# Patient Record
Sex: FEMALE | Race: WHITE
[De-identification: names, ages, dates, MRNs, and addresses within clinical notes are randomized per-mention and may not be internally consistent; named-entity substitution may affect disease eponyms.]

---

## 2020-02-08 ENCOUNTER — HOSPITAL ENCOUNTER (INPATIENT)
Dept: HOSPITAL 47 - 2SICU | Age: 80
LOS: 3 days | Discharge: NURSING, REHAB OR LONG-TERM CARE | DRG: 248 | End: 2020-02-11
Attending: INTERNAL MEDICINE | Admitting: INTERNAL MEDICINE
Payer: MEDICARE

## 2020-02-08 VITALS — BODY MASS INDEX: 24.9 KG/M2

## 2020-02-08 DIAGNOSIS — E78.5: ICD-10-CM

## 2020-02-08 DIAGNOSIS — E03.9: ICD-10-CM

## 2020-02-08 DIAGNOSIS — Z79.899: ICD-10-CM

## 2020-02-08 DIAGNOSIS — R26.81: ICD-10-CM

## 2020-02-08 DIAGNOSIS — D53.9: ICD-10-CM

## 2020-02-08 DIAGNOSIS — I25.5: ICD-10-CM

## 2020-02-08 DIAGNOSIS — Z80.1: ICD-10-CM

## 2020-02-08 DIAGNOSIS — N18.3: ICD-10-CM

## 2020-02-08 DIAGNOSIS — Z80.8: ICD-10-CM

## 2020-02-08 DIAGNOSIS — J90: ICD-10-CM

## 2020-02-08 DIAGNOSIS — I73.00: ICD-10-CM

## 2020-02-08 DIAGNOSIS — Z90.49: ICD-10-CM

## 2020-02-08 DIAGNOSIS — Z90.710: ICD-10-CM

## 2020-02-08 DIAGNOSIS — Z93.3: ICD-10-CM

## 2020-02-08 DIAGNOSIS — I48.0: ICD-10-CM

## 2020-02-08 DIAGNOSIS — I21.09: Primary | ICD-10-CM

## 2020-02-08 DIAGNOSIS — Z91.030: ICD-10-CM

## 2020-02-08 DIAGNOSIS — Z79.01: ICD-10-CM

## 2020-02-08 DIAGNOSIS — Z79.890: ICD-10-CM

## 2020-02-08 DIAGNOSIS — Z80.51: ICD-10-CM

## 2020-02-08 DIAGNOSIS — K83.8: ICD-10-CM

## 2020-02-08 DIAGNOSIS — G89.29: ICD-10-CM

## 2020-02-08 DIAGNOSIS — Z85.01: ICD-10-CM

## 2020-02-08 DIAGNOSIS — E87.5: ICD-10-CM

## 2020-02-08 DIAGNOSIS — I25.10: ICD-10-CM

## 2020-02-08 DIAGNOSIS — I27.20: ICD-10-CM

## 2020-02-08 DIAGNOSIS — Z87.891: ICD-10-CM

## 2020-02-08 DIAGNOSIS — M06.9: ICD-10-CM

## 2020-02-08 DIAGNOSIS — E43: ICD-10-CM

## 2020-02-08 DIAGNOSIS — M19.90: ICD-10-CM

## 2020-02-08 DIAGNOSIS — J96.11: ICD-10-CM

## 2020-02-08 DIAGNOSIS — R25.1: ICD-10-CM

## 2020-02-08 DIAGNOSIS — I12.9: ICD-10-CM

## 2020-02-08 DIAGNOSIS — D63.1: ICD-10-CM

## 2020-02-08 DIAGNOSIS — Z88.1: ICD-10-CM

## 2020-02-08 DIAGNOSIS — M81.0: ICD-10-CM

## 2020-02-08 DIAGNOSIS — D69.6: ICD-10-CM

## 2020-02-08 DIAGNOSIS — Z92.21: ICD-10-CM

## 2020-02-08 DIAGNOSIS — Z88.0: ICD-10-CM

## 2020-02-08 DIAGNOSIS — N28.1: ICD-10-CM

## 2020-02-08 DIAGNOSIS — I25.2: ICD-10-CM

## 2020-02-08 LAB — GLUCOSE BLD-MCNC: 65 MG/DL (ref 75–99)

## 2020-02-08 PROCEDURE — 93306 TTE W/DOPPLER COMPLETE: CPT

## 2020-02-08 PROCEDURE — 84439 ASSAY OF FREE THYROXINE: CPT

## 2020-02-08 PROCEDURE — 86334 IMMUNOFIX E-PHORESIS SERUM: CPT

## 2020-02-08 PROCEDURE — 82607 VITAMIN B-12: CPT

## 2020-02-08 PROCEDURE — 83883 ASSAY NEPHELOMETRY NOT SPEC: CPT

## 2020-02-08 PROCEDURE — 84165 PROTEIN E-PHORESIS SERUM: CPT

## 2020-02-08 PROCEDURE — 85610 PROTHROMBIN TIME: CPT

## 2020-02-08 PROCEDURE — 84443 ASSAY THYROID STIM HORMONE: CPT

## 2020-02-08 PROCEDURE — 83540 ASSAY OF IRON: CPT

## 2020-02-08 PROCEDURE — 80048 BASIC METABOLIC PNL TOTAL CA: CPT

## 2020-02-08 PROCEDURE — 4A023N7 MEASUREMENT OF CARDIAC SAMPLING AND PRESSURE, LEFT HEART, PERCUTANEOUS APPROACH: ICD-10-PCS

## 2020-02-08 PROCEDURE — 82728 ASSAY OF FERRITIN: CPT

## 2020-02-08 PROCEDURE — 76700 US EXAM ABDOM COMPLETE: CPT

## 2020-02-08 PROCEDURE — 83921 ORGANIC ACID SINGLE QUANT: CPT

## 2020-02-08 PROCEDURE — B2111ZZ FLUOROSCOPY OF MULTIPLE CORONARY ARTERIES USING LOW OSMOLAR CONTRAST: ICD-10-PCS

## 2020-02-08 PROCEDURE — 02703DZ DILATION OF CORONARY ARTERY, ONE ARTERY WITH INTRALUMINAL DEVICE, PERCUTANEOUS APPROACH: ICD-10-PCS

## 2020-02-08 PROCEDURE — 83550 IRON BINDING TEST: CPT

## 2020-02-08 PROCEDURE — 82747 ASSAY OF FOLIC ACID RBC: CPT

## 2020-02-08 PROCEDURE — 93458 L HRT ARTERY/VENTRICLE ANGIO: CPT

## 2020-02-08 PROCEDURE — 80053 COMPREHEN METABOLIC PANEL: CPT

## 2020-02-08 PROCEDURE — 92928 PRQ TCAT PLMT NTRAC ST 1 LES: CPT

## 2020-02-08 PROCEDURE — 85025 COMPLETE CBC W/AUTO DIFF WBC: CPT

## 2020-02-08 RX ADMIN — APIXABAN SCH MG: 2.5 TABLET, FILM COATED ORAL at 21:14

## 2020-02-08 RX ADMIN — ATORVASTATIN CALCIUM SCH MG: 80 TABLET, FILM COATED ORAL at 21:14

## 2020-02-08 RX ADMIN — GABAPENTIN SCH MG: 300 CAPSULE ORAL at 21:15

## 2020-02-08 RX ADMIN — METOPROLOL TARTRATE SCH MG: 25 TABLET, FILM COATED ORAL at 21:13

## 2020-02-08 RX ADMIN — Medication SCH UNIT: at 21:15

## 2020-02-08 RX ADMIN — Medication SCH MG: at 21:13

## 2020-02-08 RX ADMIN — NITROGLYCERIN ONE MCG: 10 INJECTION INTRAVENOUS at 11:06

## 2020-02-08 RX ADMIN — NITROGLYCERIN ONE MCG: 10 INJECTION INTRAVENOUS at 11:14

## 2020-02-08 NOTE — P.HPIM
History of Present Illness


H&P Date: 02/08/20


Chief Complaint: Jaw pain and chest pain.  Ear Pain





This 89-year-old pleasant female patient of Dr. tijerina.  She has underlying 

history of atrial fibrillation, CK D stage III, hypothyroidism, hyperlipidemia, 

rheumatoid arthritis osteoporosis and ischemic bowel disease, requiring 

colostomy.  She has had ear pain for quite a while now and is being worked up by

Dr. Tijerina, and Dr. Berg with no pathology is found.  Patient also has 

uncontrolled tremors, which is chronic and unsteady gait, presenting to [facility] secondary to chest pain radiating to the jaw, and shortness

of breath and exertion.  Unsteady gait.  Lightheadedness.  No dizziness.  

Patient denies any falls, patient lives independently at home.  Patient denies 

melena and hematochezia, no abdominal pain,





While at the emergency room of [facility], her troponin 

was elevated with a peak troponin of 9.4 was started on IV heparin, statins, 

beta blockers, aspirin for conservative management of an STEMI however she 

continued to have chest pain thereafter was transferred from the ICU to the Cath

Lab at [facility] on with subsequent admission to the ICU..  Patient 

was interrogated with a heart cath, has critical disease of the left anterior 

descending artery mid location, requiring a bare metal stent placement on 

02/08/2020 by Dr. Knight, for which recommendation was to antiplatelet therapy 

for 4 weeks, along with this modification,





Review of Systems


Constitutional: Reports as per HPI, Denies anorexia, Denies chills, Denies 

chronic headaches, Denies chronic pain, Denies daytime sleepiness, Denies 

fatigue, Denies fever, Denies lethargy, Denies malaise, Denies night sweats, 

Denies poor appetite, Denies sweats, Denies weakness, Denies weight gain, Denies

weight loss


Ears, nose, mouth and throat: Reports as per HPI, Reports ant. neck pain


Cardiovascular: Reports as per HPI, Reports decreased exercise tolerance, 

Reports dyspnea on exertion, Reports shortness of breath


Respiratory: Reports as per HPI


Gastrointestinal: Reports as per HPI, Denies abdominal pain, Denies belching, 

Denies bloating, Denies BRBPR, Denies change in bowel habits, Denies coffee 

ground emesis, Denies constipation, Denies diarrhea, Denies dyspepsia, Denies 

early satiety, Denies excessive gas, Denies heartburn, Denies hematemesis, Den

ies hematochezia, Denies indigestion, Denies jaundice, Denies lactose 

intolerance, Denies loss of appetite, Denies melena, Denies nausea, Denies 

vomiting


Genitourinary: Reports as per HPI, Denies abnormal vaginal bleeding, Denies 

decreased libido, Denies difficulty conceiving, Denies difficulty voiding, 

Denies dysmenorrhea, Denies dyspareunia, Denies dysuria, Denies flank pain, 

Denies genital sores, Denies hematuria, Denies hot flashes, Denies incomplete 

emptying, Denies kidney stones, Denies menorrhagia, Denies mixed incontinence, 

Denies nocturia, Denies pelvic pain, Denies post void dribbling, Denies 

pregnant, Denies prolapse symptoms, Denies stress incontinence, Denies urge 

incontinence, Denies urgency, Denies urinary frequency, Denies vaginal 

discharge, Denies vaginal dryness, Denies vaginal itching, Denies vaginal odor


Menstruation: Reports as per HPI


Musculoskeletal: Reports as per HPI


Integumentary: Reports as per HPI, Denies acne, Denies boils, Denies brittle 

nails, Denies change in hair/nails, Denies color changes, Denies darkening of 

skin, Denies depigmentation, Denies dryness, Denies foot/leg ulcers, Denies 

growths, Denies hirsutism, Denies lesions, Denies onychomycosis, Denies 

pruritus, Denies rash, Denies sores, Denies striae, Denies unusual bruising, 

Denies wounds


Neurological: Reports as per HPI, Denies aphasia, Denies ataxia, Denies balance 

difficulties, Denies burning pain, Denies change in mentation, Denies change in 

smell/taste, Denies change in speech, Denies confusion, Denies convulsions, 

Denies double vision, Denies gait dysfunction, Denies head injury, Denies 

headaches, Denies hearing difficulties, Denies lack of coordination, Denies loss

of vision, Denies memory loss, Denies migraines, Denies motor disturbance, 

Denies numbness, Denies paralysis, Denies paresthesias, Denies seizures, Denies 

sensory deficit, Denies spasticity, Denies syncope, Denies tic, Denies tingling,

Denies transient paralysis, Denies tremors, Denies vertigo, Denies weakness, 

Denies visual changes


Psychiatric: Reports as per HPI, Denies anhedonia, Denies anxiety, Denies 

anxiety attacks, Denies change in appetite, Denies change in libido, Denies 

change in sleep habits, Denies confusion, Denies depression, Denies difficulty 

concentrating, Denies disorientation, Denies hallucinations, Denies 

hopelessness, Denies hypersomnia, Denies insomnia, Denies irritability, Denies 

memory loss, Denies mood swings, Denies paranoia, Denies sadness/tearfulness, 

Denies sleep disturbances, Denies suicidal ideation


Endocrine: Reports as per HPI, Denies cold intolerance, Denies deepening of the 

voice, Denies excessive sweating, Denies excessive thirst, Denies fatigue, 

Denies flushing, Denies heat intolerance, Denies high blood sugars, Denies 

increase in ring/shoe/hat size, Denies low blood sugars, Denies nocturia, Denies

palpitations, Denies polydipsia, Denies polyphagia, Denies polyuria, Denies 

proptosis, Denies recent glucocorticoid use, Denies thyroid mass, Denies weight 

change


Hematologic/Lymphatic: Reports as per HPI, Denies easy bleeding, Denies easy 

bruising, Denies lymphadenopathy, Denies lymphedema, Denies thrombophilia


Allergic/Immunologic: Reports as per HPI





Past Medical History


Past Medical History: Atrial Fibrillation, Coronary Artery Disease (CAD), 

Cancer, Chest Pain / Angina, Hypertension, Myocardial Infarction (MI), 

Osteoarthritis (OA), Thyroid Disorder


Additional Past Medical History / Comment(s): " LEAKY VALVES", ESOPHAGEAL 

CANCER,  DECREASED KIDNEY FUNCTION, CKD, ischemic bowel, Raynauds, Cardiac cath 

with stents.


Last Myocardial Infarction Date:: 2/8/20


History of Any Multi-Drug Resistant Organisms: None Reported


Past Surgical History: Appendectomy, Cholecystectomy, Heart Catheterization, 

Hysterectomy


Additional Past Surgical History / Comment(s): ESOPHAGUS SURGERY, FOOT SURGERY, 

LEFT KNEE SURGERY, PARATHYROID SURGERY, BILATERAL WRIST, colectory with ostomy 

placement.


Past Anesthesia/Blood Transfusion Reactions: No Reported Reaction


Past Psychological History: No Psychological Hx Reported


Smoking Status: Former smoker


Past Alcohol Use History: Rare


Additional Past Alcohol Use History / Comment(s): STARTED SMOKING AT AGE 13 QUIT

SMOKING AT AGE 53 SMOKED 2-3PPD


Past Drug Use History: None Reported





- Past Family History


  ** Mother


Family Medical History: Cancer (Kidney)





  ** Father


Family Medical History: Cancer (Mouth cancer at 84)





  ** Daughter(s)


Family Medical History: Cancer (Lung cancer)





  ** Brother(s)


Family Medical History: No Reported History





  ** Sister(s)


Family Medical History: No Reported History





Medications and Allergies


                                Home Medications











 Medication  Instructions  Recorded  Confirmed  Type


 


Cinnamon Bark [Cinnamon] 500 mg PO DAILY 10/04/16 02/08/20 History


 


Ferrous Sulfate [Feosol] 325 mg PO BID 10/04/16 02/08/20 History


 


Furosemide [Lasix] 20 mg PO Q48H PRN 10/04/16 02/08/20 History


 


Gabapentin [Neurontin] 300 mg PO BID@0800,1200 10/04/16 02/08/20 History


 


Levothyroxine Sodium [Synthroid] 25 mcg PO DAILY 10/04/16 02/08/20 History


 


Magnesium Gluconate [Magonate] 500 mg PO BID 10/04/16 02/08/20 History


 


Multivitamins, Thera [Multivitamin] 1 tab PO DAILY 10/04/16 02/08/20 History


 


Vitamin B Complex 1 cap PO DAILY 10/04/16 02/08/20 History


 


Apixaban [Eliquis] 2.5 mg PO BID 02/08/20 02/08/20 History


 


Calcium Carbonate [Calcium] 1,200 mg PO BID 02/08/20 02/08/20 History


 


Cholecalciferol [Vitamin D3 (25 2,000 unit PO BID 02/08/20 02/08/20 History





Mcg = 1000 Iu)]    


 


Cranberry Fruit Extract [Cranberry] 500 mg PO DAILY 02/08/20 02/08/20 History


 


Gabapentin [Neurontin] 600 mg PO HS 02/08/20 02/08/20 History


 


Krill Oil 500 mg PO DAILY 02/08/20 02/08/20 History


 


Lecithin  200 mg PO DAILY 02/08/20 02/08/20 History


 


Propranolol HCl [Propranolol HCl 60 mg PO BID 02/08/20 02/08/20 History





ER]    


 


oxyCODONE HCL [oxyCODONE HCL (IR)] 10 mg PO Q4H PRN 02/08/20 02/08/20 History








                                    Allergies











Allergy/AdvReac Type Severity Reaction Status Date / Time


 


ciprofloxacin [From Cipro] Allergy  Nausea & Verified 02/08/20 14:23





   Vomiting  


 


Penicillins Allergy  Anaphylaxis Verified 02/08/20 14:23


 


venom-honey bee Allergy  Anaphylaxis Verified 02/08/20 14:23





[bee venom (honey bee)]     


 


venom-wasp [wasp venom] Allergy  Anaphylaxis Verified 02/08/20 14:23














Physical Exam


Vitals: 


                                   Vital Signs











  Temp Pulse Resp BP BP Pulse Ox


 


 02/08/20 15:50   71  12  89/52   97


 


 02/08/20 15:40   66  4 L  88/52   97


 


 02/08/20 15:30   66  12  100/63   98


 


 02/08/20 15:20   66  6 L  100/63   95


 


 02/08/20 15:10   70  22  84/53   96


 


 02/08/20 15:00   68  7 L  100/54   96


 


 02/08/20 14:50   70  32 H  100/54   95


 


 02/08/20 14:40   72  21  96/54   80 L


 


 02/08/20 14:30   68  0 L  99/62   95


 


 02/08/20 14:20   75  22  99/62   94 L


 


 02/08/20 14:10   73  23  105/60   86 L


 


 02/08/20 14:00   73  10 L  94/63   97


 


 02/08/20 13:50   75  26 H  94/63   77 L


 


 02/08/20 13:40   81  18  97/54   95


 


 02/08/20 13:30   72  16  109/52   95


 


 02/08/20 13:20   87  20  109/52   94 L


 


 02/08/20 13:12  98 F   18    95


 


 02/08/20 13:10   85  17  95/57   97


 


 02/08/20 13:00   77  13  100/56   95


 


 02/08/20 12:50   73  19  87/53   97


 


 02/08/20 12:40   73  21  116/63   79 L


 


 02/08/20 12:30   80  11 L  105/49   89 L


 


 02/08/20 12:20   71  46 H  105/49   83 L


 


 02/08/20 12:10   75  28 H  111/61   98


 


 02/08/20 12:00   85  15  104/62   94 L


 


 02/08/20 11:57  97 F L   18    96


 


 02/08/20 11:50   84  47 H  104/62   89 L


 


 02/08/20 11:42  97 F L   16   94/63  94 L


 


 02/08/20 11:27  97.4 F L   17   99/62  95


 


 02/08/20 10:28  97.2 F L   18   105/60  95


 


 02/08/20 09:50   77  19    96


 


 02/08/20 09:40   70  14    97


 


 02/08/20 09:30   71  16  99/52   96


 


 02/08/20 09:21   70  9 L    99








                                Intake and Output











 02/08/20 02/08/20 02/08/20





 06:59 14:59 22:59


 


Intake Total  895 


 


Output Total  250 


 


Balance  645 


 


Intake:   


 


  IV  200 


 


  Intake, IV Titration  275 





  Amount   


 


    Bivalirudin 250 mg In  50 





    Sodium Chloride 0.9% 50   





    ml @ 0 mls/hr IV .[facility]   





    ONE Rx#:[id number]   


 


    Sodium Chloride 0.9% 1,  225 





    000 ml @ 75 mls/hr IV .   





    [id number] [facility] Rx#:[id number]   


 


  Oral  420 


 


Output:   


 


  Urine  250 


 


Other:   


 


  Weight  56 kg 














- Constitutional


General appearance: average body habitus, cooperative, no acute distress





- EENT


Eyes: anicteric sclerae, EOMI, PERRLA, dentition normal


ENT: NA/AT, normal oropharynx





- Neck


Neck: normal ROM





- Respiratory


Respiratory: bilateral: CTA, negative: diminished, dullness, rales





- Cardiovascular


Rhythm: regular


Heart sounds: normal: S1, S2


Abnormal Heart Sounds: no systolic murmur, no diastolic murmur, no rub, no S3 

Gallop, no S4 Gallop, no click, no other





- Gastrointestinal


General gastrointestinal: normal bowel sounds, soft





- Integumentary


Integumentary: decreased turgor, normal





- Neurologic


Neurologic: CNII-XII intact





- Musculoskeletal





Tremors


Musculoskeletal: strength equal bilaterally





- Psychiatric


Psychiatric: A&O x's 3, appropriate affect, intact judgment & insight





Results


Labs: 


                  Abnormal Lab Results - Last 24 Hours (Table)











  02/08/20 Range/Units





  11:59 


 


POC Glucose (mg/dL)  65 L  (75-99)  mg/dL








                               Laboratory Results











POC Glucose (mg/dL)  65 mg/dL (75-99)  L  02/08/20  11:59    


 


POC Glu Operater Dhara Vernon   02/08/20  11:59    














Thrombosis Risk Factor Assmnt





- DVT/VTE Prophylaxis


DVT/VTE Prophylaxis: Pharmacologic Prophylaxis ordered, Low risk, early 

ambulation encouraged





- Choose All That Apply


Each Factor Represents 1 point: Acute MI, Medical pt on bed rest


Other Risk Factors: Yes


Each Risk Factor Represents 3 Points: Age 75 years or older


Other congenital or acquired thrombophilia - If yes, enter type in comment: No


Thrombosis Risk Factor Assessment Total Risk Factor Score: 5


Thrombosis Risk Factor Assessment Level: High Risk





Assessment and Plan


Plan: 





1.  Acute NSTEMI, requiring bare-metal stent to the mid LAD performed on 

02/08/2020 by Dr. Gauthier, with recommendations for dual antiplatelet therapy for 

4 weeks, risk modification, statin and aspirin/Plavix we will discontinue 

propranolol, and replace it with metoprolol 25 twice a day





2.  Rheumatoid arthritis, on gabapentin 600 mg at bedtime and 300 mg twice a 

day, not on DMARD's or prednisone





3  Paroxysmal atrial fibrillation on eliquis 2.5 mg twice a day





4.  CK D stage III, patient's creatinine was 1.9, with IV hydration, monitor for

contrast nephropathy, being monitored in a.m.





5.  Anemia chronic disease with hemoglobin of 10.3





6.  Prior history of ischemic bowel disease requiring colostomy placement and 

hemicolectomy





7.  Chronic ear pain, bilateral severe, this has resolved after stent placement 

on the mid LAD





8.  Benign tremors





9.  Severe malnutrition facial supplementation





10.  Hypothyroidism on levothyroxine 25 g daily





10.  Chronic pain on oxycodone 10 mg every 4 when necessary per home dose by PCP

## 2020-02-08 NOTE — LTR
February 8, 2020





Re: Courtney Heller





Dear Myrna:



I performed cardiac catheterization on Courtney Heller. A detailed catheterization

note is enclosed for your records. In brief, the cardiac catheterization shows a

critical stenosis involving mid LAD and patient will undergo angioplasty with stent

placement of the same.



Thank you for giving us the privilege to participate in the care of this pleasant lady.



Sincerely,





MD IAN Maynard / ALBA: [id number] / Job#: [id number]

## 2020-02-08 NOTE — PTCA
PERCUTANEOUSTRANS CORORONARY ANGIOGRAPHY



PERCUTANEOUS CORONARY INTERVENTION:



DATE OF SERVICE:

February 8, 2020.



PERFORMING PHYSICIAN:

Jadiel Knight MD.



PROCEDURE PERFORMED:

Successful stenting of the mid left anterior descending artery using 2.75 x 18 mm

Vision bare metal stent with an excellent angiographic result and reduction of stenosis

from 99% to 0%.



INDICATION:

This is an 79-year-old female patient who was admitted to [facility]

with chest discomfort and was ruled in for acute non-ST-elevation myocardial

infarction.  The patient does have multiple comorbid conditions as well as with her

age, initially, conservative medical approach was applied, but the patient continues to

have ongoing chest discomfort.  Because of that, a heart catheterization was advised.



The patient underwent a heart catheterization by Dr. Bell and that revealed critical

disease involving the mid left anterior descending artery.



COMPLICATION:

None.



LEVEL OF SEDATION:

Moderate with sedation length of 17 minutes.



PROCEDURE DESCRIPTION:

Please refer to the diagnostic heart catheterization that was performed by Dr. Bell

earlier today.



Anticoagulation was initiated using Angiomax.  Subsequently I did engage the left main

coronary artery using JL3.5 guide.  I did wire the LAD using a Whisper J wire.  After

that I did balloon angioplasty using 2.5 x 12 mm balloon before I deployed 2.75 x 18 mm

Vision bare metal stent where the stent was positioned under fluoroscopy guidance and

deployed under its nominal pressure.  The following angiogram showed excellent

angiographic results and the procedure was completed without any complication.



By the end selective left common femoral artery angiogram was performed before we

deployed the Angio-Seal groin closure device.



POSTPROCEDURE MANAGEMENT:

1. Dual antiplatelet therapy for 4 weeks.

2. Risk factors modifications.

3. Follow up with the patient.





MMODL / IJN: [id number] / Job#: [id number]

## 2020-02-08 NOTE — CC
CARDIAC CATHETERIZATION REPORT



INDICATION:

Acute anterior wall myocardial infarction.



PROCEDURE NOTE:

After obtaining informed consent, left heart catheterization and coronary angiogram

were performed via the left femoral artery using standard Bigg catheters. Her

colostomy tube is very close to the right femoral, hence we went from the left femoral

artery.  We obtained vascular access on second attempt.  The patient tolerated the

procedure well without any obvious immediate complications.  She has renal

insufficiency and is at risk for contrast induced nephropathy and has thrombocytopenia

and there is a risk of bleeding.  She understands and is agreement with the procedure.

The patient received moderate conscious sedation.  Total sedation time was 16 minutes.



FINDINGS:

1. HEMODYNAMICS: Left ventricular end-diastolic pressure is 20 mm. There is no

    significant gradient across the aortic valve.

2. LEFT VENTRICULOGRAM: Left ventriculogram is not performed.

3. ANGIOGRAPHIC DATA:

Left Main Coronary Artery: Left main coronary artery is a normal-sized vessel and

divides into left anterior descending coronary artery and circumflex coronary artery.

LAD shows a focal 99% stenosis in its midportion with delayed filling of the distal

LAD.

Circumflex coronary artery is a dominant vessel where the OM branch is totally

occluded.

Right coronary artery is a small caliber vessel with mild to moderate diffuse disease

involving the mid RCA.



CONCLUSIONS:

A 99% stenosis involving mid LAD with chronic occlusion of the OM branch and

nonobstructive disease involving right coronary artery.



PLAN:

Patient will undergo angioplasty with stent placement of LAD.





MMODL / IJN: [id number] / Job#: [id number]

## 2020-02-09 LAB
ALBUMIN SERPL-MCNC: 2.4 G/DL (ref 3.5–5)
ALP SERPL-CCNC: 107 U/L (ref 38–126)
ALT SERPL-CCNC: 26 U/L (ref 4–34)
ANION GAP SERPL CALC-SCNC: 4 MMOL/L
AST SERPL-CCNC: 88 U/L (ref 14–36)
BASOPHILS # BLD AUTO: 0 K/UL (ref 0–0.2)
BASOPHILS NFR BLD AUTO: 0 %
BUN SERPL-SCNC: 44 MG/DL (ref 7–17)
CALCIUM SPEC-MCNC: 7.8 MG/DL (ref 8.4–10.2)
CHLORIDE SERPL-SCNC: 114 MMOL/L (ref 98–107)
CO2 SERPL-SCNC: 17 MMOL/L (ref 22–30)
EOSINOPHIL # BLD AUTO: 0 K/UL (ref 0–0.7)
EOSINOPHIL NFR BLD AUTO: 1 %
ERYTHROCYTE [DISTWIDTH] IN BLOOD BY AUTOMATED COUNT: 3.06 M/UL (ref 3.8–5.4)
ERYTHROCYTE [DISTWIDTH] IN BLOOD: 13.7 % (ref 11.5–15.5)
GLUCOSE SERPL-MCNC: 97 MG/DL (ref 74–99)
HCT VFR BLD AUTO: 33.2 % (ref 34–46)
HGB BLD-MCNC: 9.9 GM/DL (ref 11.4–16)
LYMPHOCYTES # SPEC AUTO: 0.5 K/UL (ref 1–4.8)
LYMPHOCYTES NFR SPEC AUTO: 15 %
MCH RBC QN AUTO: 32.4 PG (ref 25–35)
MCHC RBC AUTO-ENTMCNC: 29.9 G/DL (ref 31–37)
MCV RBC AUTO: 108.4 FL (ref 80–100)
MONOCYTES # BLD AUTO: 0.2 K/UL (ref 0–1)
MONOCYTES NFR BLD AUTO: 6 %
NEUTROPHILS # BLD AUTO: 2.3 K/UL (ref 1.3–7.7)
NEUTROPHILS NFR BLD AUTO: 75 %
PLATELET # BLD AUTO: 67 K/UL (ref 150–450)
POTASSIUM SERPL-SCNC: 5.6 MMOL/L (ref 3.5–5.1)
PROT SERPL-MCNC: 4.7 G/DL (ref 6.3–8.2)
SODIUM SERPL-SCNC: 135 MMOL/L (ref 137–145)
T4 FREE SERPL-MCNC: 1.46 NG/DL (ref 0.78–2.19)
WBC # BLD AUTO: 3.1 K/UL (ref 3.8–10.6)

## 2020-02-09 RX ADMIN — GABAPENTIN SCH MG: 300 CAPSULE ORAL at 20:27

## 2020-02-09 RX ADMIN — LEVOTHYROXINE SODIUM SCH MCG: 25 TABLET ORAL at 06:40

## 2020-02-09 RX ADMIN — ASPIRIN 325 MG ORAL TABLET SCH MG: 325 PILL ORAL at 09:20

## 2020-02-09 RX ADMIN — METOPROLOL TARTRATE SCH MG: 25 TABLET, FILM COATED ORAL at 20:27

## 2020-02-09 RX ADMIN — Medication SCH UNIT: at 20:28

## 2020-02-09 RX ADMIN — APIXABAN SCH MG: 2.5 TABLET, FILM COATED ORAL at 09:20

## 2020-02-09 RX ADMIN — GABAPENTIN SCH MG: 300 CAPSULE ORAL at 09:20

## 2020-02-09 RX ADMIN — CLOPIDOGREL BISULFATE SCH MG: 75 TABLET ORAL at 09:20

## 2020-02-09 RX ADMIN — Medication SCH UNIT: at 09:20

## 2020-02-09 RX ADMIN — APIXABAN SCH MG: 2.5 TABLET, FILM COATED ORAL at 20:27

## 2020-02-09 RX ADMIN — METOPROLOL TARTRATE SCH MG: 25 TABLET, FILM COATED ORAL at 09:20

## 2020-02-09 RX ADMIN — GABAPENTIN SCH MG: 300 CAPSULE ORAL at 11:56

## 2020-02-09 RX ADMIN — THERA TABS SCH EACH: TAB at 09:20

## 2020-02-09 RX ADMIN — Medication SCH MG: at 09:20

## 2020-02-09 RX ADMIN — ATORVASTATIN CALCIUM SCH MG: 80 TABLET, FILM COATED ORAL at 20:26

## 2020-02-09 RX ADMIN — Medication SCH MG: at 20:28

## 2020-02-09 NOTE — P.PN
Subjective


Progress Note Date: 02/09/20








This 89-year-old pleasant female patient of Dr. tijerina.  She has underlying 

history of atrial fibrillation, CK D stage III, hypothyroidism, hyperlipidemia, 

rheumatoid arthritis osteoporosis and ischemic bowel disease, requiring 

colostomy.  She has had ear pain for quite a while now and is being worked up by

Dr. Tijerina, and Dr. Berg with no pathology is found.  Patient also has 

uncontrolled tremors, which is chronic and unsteady gait, presenting to [facility] secondary to chest pain radiating to the jaw, and shortness

of breath and exertion.  Unsteady gait.  Lightheadedness.  No dizziness.  

Patient denies any falls, patient lives independently at home.  Patient denies 

melena and hematochezia, no abdominal pain,





While at the emergency room of [facility], her troponin 

was elevated with a peak troponin of 9.4 was started on IV heparin, statins, 

beta blockers, aspirin for conservative management of an STEMI however she 

continued to have chest pain thereafter was transferred from the ICU to the Cath

Lab at [facility] on with subsequent admission to the ICU..  Patient 

was interrogated with a heart cath, has critical disease of the left anterior 

descending artery mid location, requiring a bare metal stent placement on 

02/08/2020 by Dr. Knight, for which recommendation was to antiplatelet therapy 

for 4 weeks, along with this modification,





2/9: Patient remains in the intensive care unit waiting for a selective care 

bed.  The patient denies having any chest pain, no ear pain this has resolved.  

Left groin without any bleeding or bruit hematoma.  A repeat lab work reveals of

potassium of 5.6 and one dose of Kayexalate ordered.  BUN is 44 and creatinine 

1.5, hemoglobin 9.9 and platelet count 67.  Patient is scheduled for 

echocardiogram.  Repeat lab work in the morning.





 Review of Systems 


Constitutional: Reports as per HPI, Denies anorexia, Denies chills, Denies 

chronic headaches, Denies chronic pain, Denies daytime sleepiness, Denies 

fatigue, Denies fever, Denies lethargy, Denies malaise, Denies night sweats, 

Denies poor appetite, Denies sweats, Denies weakness, Denies weight gain, Denies

weight loss


Ears, nose, mouth and throat: Denies ear pain Reports ant. neck pain


Cardiovascular: Reports as per HPI, Reports decreased exercise tolerance, 

Reports dyspnea on exertion, Reports shortness of breath


Respiratory: Reports as per HPI


Gastrointestinal: Reports as per HPI, Denies abdominal pain, Denies belching, 

Denies bloating, Denies BRBPR, Denies change in bowel habits, Denies coffee grou

nd emesis, Denies constipation, Denies diarrhea, Denies dyspepsia, Denies early 

satiety, Denies excessive gas, Denies heartburn, Denies hematemesis, Denies 

hematochezia, Denies indigestion, Denies jaundice, Denies lactose intolerance, 

Denies loss of appetite, Denies melena, Denies nausea, Denies vomiting


Genitourinary: Reports as per HPI, Denies abnormal vaginal bleeding, Denies 

decreased libido, Denies difficulty conceiving, Denies difficulty voiding, 

Denies dysmenorrhea, Denies dyspareunia, Denies dysuria, Denies flank pain, 

Denies genital sores, Denies hematuria, Denies hot flashes, Denies incomplete 

emptying, Denies kidney stones, Denies menorrhagia, Denies mixed incontinence, 

Denies nocturia, Denies pelvic pain, Denies post void dribbling, Denies 

pregnant, Denies prolapse symptoms, Denies stress incontinence, Denies urge 

incontinence, Denies urgency, Denies urinary frequency, Denies vaginal 

discharge, Denies vaginal dryness, Denies vaginal itching, Denies vaginal odor


Musculoskeletal: Reports as per HPI


Integumentary: Reports as per HPI, Denies acne, Denies boils, Denies brittle 

nails, Denies change in hair/nails, Denies color changes, Denies darkening of 

skin, Denies depigmentation, Denies dryness, Denies foot/leg ulcers, Denies 

growths, Denies hirsutism, Denies lesions, Denies onychomycosis, Denies 

pruritus, Denies rash, Denies sores, Denies striae, Denies unusual bruising, 

Denies wounds


Neurological: Reports as per HPI, Denies aphasia, Denies ataxia, Denies balance 

difficulties, Denies burning pain, Denies change in mentation, Denies change in 

smell/taste, Denies change in speech, Denies confusion, Denies convulsions, 

Denies double vision, Denies gait dysfunction, Denies head injury, Denies 

headaches, Denies hearing difficulties, Denies lack of coordination, Denies loss

of vision, Denies memory loss, Denies migraines, Denies motor disturbance, 

Denies numbness, Denies paralysis, Denies paresthesias, Denies seizures, Denies 

sensory deficit, Denies spasticity, Denies syncope, Denies tic, Denies tingling,

Denies transient paralysis, Denies tremors, Denies vertigo, Denies weakness, 

Denies visual changes


Psychiatric: Reports as per HPI, Denies anhedonia, Denies anxiety, Denies 

anxiety attacks, Denies change in appetite, Denies change in libido, Denies 

change in sleep habits, Denies confusion, Denies depression, Denies difficulty 

concentrating, Denies disorientation, Denies hallucinations, Denies 

hopelessness, Denies hypersomnia, Denies insomnia, Denies irritability, Denies 

memory loss, Denies mood swings, Denies paranoia, Denies sadness/tearfulness, 

Denies sleep disturbances, Denies suicidal ideation


Endocrine: Reports as per HPI, Denies cold intolerance, Denies deepening of the 

voice, Denies excessive sweating, Denies excessive thirst, Denies fatigue, 

Denies flushing, Denies heat intolerance, Denies high blood sugars, Denies 

increase in ring/shoe/hat size, Denies low blood sugars, Denies nocturia, Denies

palpitations, Denies polydipsia, Denies polyphagia, Denies polyuria, Denies 

proptosis, Denies recent glucocorticoid use, Denies thyroid mass, Denies weight 

change


Hematologic/Lymphatic: Reports as per HPI, Denies easy bleeding, Denies easy 

bruising, Denies lymphadenopathy, Denies lymphedema, Denies thrombophilia


Allergic/Immunologic: Reports as per HPI





Objective





- Vital Signs


Vital signs: 


                                   Vital Signs











Temp  98 F   02/09/20 12:00


 


Pulse  68   02/09/20 12:00


 


Resp  15   02/09/20 12:00


 


BP  93/54   02/09/20 12:00


 


Pulse Ox  95   02/09/20 12:00








                                 Intake & Output











 02/08/20 02/09/20 02/09/20





 18:59 06:59 18:59


 


Intake Total 1500 900 450


 


Output Total 575 660 815


 


Balance 925 240 -365


 


Weight 56 kg 59 kg 


 


Intake:   


 


   825 150


 


    Sodium Chloride 0.9% 1,  825 150





    000 ml @ 75 mls/hr IV .   





    [id number] [facility] Rx#:[id number]   


 


  Intake, IV Titration 700 75 300





  Amount   


 


    Bivalirudin 250 mg In 100  





    Sodium Chloride 0.9% 50   





    ml @ 0 mls/hr IV .STK-MED   





    ONE Rx#:[id number]   


 


    Sodium Chloride 0.9% 1, 600 75 300





    000 ml @ 75 mls/hr IV .   





    [id number] [facility] Rx#:[id number]   


 


  Oral 600  


 


Output:   


 


  Urine 575 360 165


 


  Stool  300 650


 


Other:   


 


  Voiding Method Indwelling Catheter Indwelling Catheter Indwelling Catheter














- Exam





- Constitutional


General appearance: average body habitus, cooperative, no acute distress, p

atient is in ICU bed





- EENT


Eyes: anicteric sclerae, EOMI, PERRLA, dentition normal


ENT: NA/AT, normal oropharynx





- Neck


Neck: normal ROM





- Respiratory


Respiratory: bilateral: CTA, negative: diminished, dullness, rales





- Cardiovascular


Rhythm: regular


Heart sounds: normal: S1, S2


Abnormal Heart Sounds: no systolic murmur, no diastolic murmur, no rub, no S3 

Gallop, no S4 Gallop, no click, no other





- Gastrointestinal


General gastrointestinal: normal bowel sounds, soft





- Integumentary


Integumentary: decreased turgor, normal





- Neurologic


Neurologic: CNII-XII intact





- Musculoskeletal





Tremors


Musculoskeletal: strength equal bilaterally





- Psychiatric


Psychiatric: A&O x's 3, appropriate affect, intact judgment & insight





- Labs


CBC & Chem 7: 


                                 02/09/20 04:05





                                 02/09/20 04:05


Labs: 


                  Abnormal Lab Results - Last 24 Hours (Table)











  02/09/20 02/09/20 Range/Units





  04:05 04:05 


 


WBC   3.1 L  (3.8-10.6)  k/uL


 


RBC   3.06 L  (3.80-5.40)  m/uL


 


Hgb   9.9 L  (11.4-16.0)  gm/dL


 


Hct   33.2 L  (34.0-46.0)  %


 


MCV   108.4 H  (80.0-100.0)  fL


 


MCHC   29.9 L  (31.0-37.0)  g/dL


 


Plt Count   67 L  (150-450)  k/uL


 


Lymphocytes #   0.5 L  (1.0-4.8)  k/uL


 


Macrocytosis   Marked A  


 


Sodium  135 L   (137-145)  mmol/L


 


Potassium  5.6 H   (3.5-5.1)  mmol/L


 


Chloride  114 H   ()  mmol/L


 


Carbon Dioxide  17 L   (22-30)  mmol/L


 


BUN  44 H   (7-17)  mg/dL


 


Creatinine  1.50 H   (0.52-1.04)  mg/dL


 


Calcium  7.8 L   (8.4-10.2)  mg/dL


 


AST  88 H   (14-36)  U/L


 


Total Protein  4.7 L   (6.3-8.2)  g/dL


 


Albumin  2.4 L   (3.5-5.0)  g/dL


 


TSH  0.288 L   (0.465-4.680)  mIU/L














Assessment and Plan


Plan: 





1.  Acute NSTEMI, requiring bare-metal stent to the mid LAD performed on 

02/08/2020 by Dr. Gauthier, with recommendations for dual antiplatelet therapy for 

4 weeks, risk modification, statin and aspirin/Plavix we will discontinue 

propranolol, and replace it with metoprolol 25 twice a day.  Transfer to cardiac

stepdown unit





2.  Rheumatoid arthritis, on gabapentin 600 mg at bedtime and 300 mg twice a 

day, not on DMARD's or prednisone





3  Paroxysmal atrial fibrillation on eliquis 2.5 mg twice a day





4.  CK D stage III, patient's creatinine was 1.9, with IV hydration, monitor for

contrast nephropathy, being monitored in a.m.





5.  Anemia chronic disease with hemoglobin of 10.3





6.  Prior history of ischemic bowel disease requiring colostomy placement and 

hemicolectomy





7.  Chronic ear pain, bilateral severe, this has resolved after stent placement 

on the mid LAD





8.  Benign tremors





9.  Severe malnutrition facial supplementation





10.  Hypothyroidism on levothyroxine 25 g daily





10.  Chronic pain on oxycodone 10 mg every 4 when necessary per home dose by PCP





11.  Hyperkalemia most likely related to chronic kidney disease.  Kayexalate 1 

dose ordered.





Discharge plan: To be determined





Impression and plan of care have been directed as dictated by the signing 

physician.  Leena Barragan nurse practitioner acting as scribe for signing 

physician.

## 2020-02-09 NOTE — PN
PROGRESS NOTE



Courtney is admitted to hospital with acute myocardial infarction.  Underwent cardiac

catheterization and angioplasty of mid LAD.  This morning she is doing well.  Does not

have any chest pain, somewhat hypotensive and is on optimal medical therapy.



PHYSICAL EXAMINATION:

On exam, she is comfortable at rest.  Vital signs are stable.  Left groin is free of

bleeding, bruit or hematoma.  Foot pulses are intact.  Heart exam reveals first and

second heart sounds.  No gallop.  She has irregular rhythm.



Currently on Eliquis 2.5 b.i.d., aspirin, Lipitor, Lasix, Lopressor and Plavix.



LABS:

Labs show that her potassium is elevated at 5.6, BUN is 44, creatinine is 1.5,

hemoglobin is 9.9, platelet count is low at 67.



ASSESSMENT:

Acute anterior wall myocardial infarction status post catheterization and angioplasty

of LAD.  The patient is doing well and we will continue current medications. Obtain a

2D echo on her tomorrow morning.





MMODL / IJN: [id number] / Job#: [id number]

## 2020-02-10 LAB
ALBUMIN SERPL-MCNC: 2.3 G/DL (ref 3.5–5)
ALP SERPL-CCNC: 94 U/L (ref 38–126)
ALT SERPL-CCNC: 19 U/L (ref 4–34)
ANION GAP SERPL CALC-SCNC: 4 MMOL/L
AST SERPL-CCNC: 54 U/L (ref 14–36)
BASOPHILS # BLD AUTO: 0 K/UL (ref 0–0.2)
BASOPHILS NFR BLD AUTO: 0 %
BUN SERPL-SCNC: 38 MG/DL (ref 7–17)
CALCIUM SPEC-MCNC: 7.8 MG/DL (ref 8.4–10.2)
CHLORIDE SERPL-SCNC: 115 MMOL/L (ref 98–107)
CO2 SERPL-SCNC: 17 MMOL/L (ref 22–30)
EOSINOPHIL # BLD AUTO: 0.1 K/UL (ref 0–0.7)
EOSINOPHIL NFR BLD AUTO: 2 %
ERYTHROCYTE [DISTWIDTH] IN BLOOD BY AUTOMATED COUNT: 2.84 M/UL (ref 3.8–5.4)
ERYTHROCYTE [DISTWIDTH] IN BLOOD: 13.8 % (ref 11.5–15.5)
FERRITIN SERPL-MCNC: 254.8 NG/ML (ref 10–291)
GLUCOSE SERPL-MCNC: 97 MG/DL (ref 74–99)
HCT VFR BLD AUTO: 30.9 % (ref 34–46)
HGB BLD-MCNC: 9.6 GM/DL (ref 11.4–16)
INR PPP: 1.1 (ref ?–1.2)
IRON SERPL-MCNC: 17 UG/DL (ref 50–170)
LYMPHOCYTES # SPEC AUTO: 0.7 K/UL (ref 1–4.8)
LYMPHOCYTES NFR SPEC AUTO: 17 %
MCH RBC QN AUTO: 33.9 PG (ref 25–35)
MCHC RBC AUTO-ENTMCNC: 31.2 G/DL (ref 31–37)
MCV RBC AUTO: 108.7 FL (ref 80–100)
MONOCYTES # BLD AUTO: 0.3 K/UL (ref 0–1)
MONOCYTES NFR BLD AUTO: 6 %
NEUTROPHILS # BLD AUTO: 3 K/UL (ref 1.3–7.7)
NEUTROPHILS NFR BLD AUTO: 72 %
PLATELET # BLD AUTO: 86 K/UL (ref 150–450)
POTASSIUM SERPL-SCNC: 5.2 MMOL/L (ref 3.5–5.1)
PROT SERPL-MCNC: 4.5 G/DL (ref 6.3–8.2)
PT BLD: 11 SEC (ref 9–12)
SODIUM SERPL-SCNC: 136 MMOL/L (ref 137–145)
TIBC SERPL-MCNC: 210 UG/DL (ref 228–460)
VIT B12 SERPL-MCNC: 1177 PG/ML (ref 200–944)
WBC # BLD AUTO: 4.2 K/UL (ref 3.8–10.6)

## 2020-02-10 RX ADMIN — METOPROLOL TARTRATE SCH MG: 25 TABLET, FILM COATED ORAL at 08:30

## 2020-02-10 RX ADMIN — CLOPIDOGREL BISULFATE SCH MG: 75 TABLET ORAL at 08:30

## 2020-02-10 RX ADMIN — ATORVASTATIN CALCIUM SCH MG: 80 TABLET, FILM COATED ORAL at 21:28

## 2020-02-10 RX ADMIN — GABAPENTIN SCH MG: 300 CAPSULE ORAL at 08:29

## 2020-02-10 RX ADMIN — Medication SCH MG: at 21:28

## 2020-02-10 RX ADMIN — LORATADINE SCH MG: 10 TABLET ORAL at 11:12

## 2020-02-10 RX ADMIN — THERA TABS SCH EACH: TAB at 08:30

## 2020-02-10 RX ADMIN — FUROSEMIDE SCH MG: 20 TABLET ORAL at 11:13

## 2020-02-10 RX ADMIN — APIXABAN SCH MG: 2.5 TABLET, FILM COATED ORAL at 08:29

## 2020-02-10 RX ADMIN — Medication SCH UNIT: at 08:29

## 2020-02-10 RX ADMIN — APIXABAN SCH MG: 2.5 TABLET, FILM COATED ORAL at 21:28

## 2020-02-10 RX ADMIN — ASPIRIN 325 MG ORAL TABLET SCH MG: 325 PILL ORAL at 08:29

## 2020-02-10 RX ADMIN — METOPROLOL TARTRATE SCH MG: 25 TABLET, FILM COATED ORAL at 21:28

## 2020-02-10 RX ADMIN — Medication SCH MG: at 08:30

## 2020-02-10 RX ADMIN — GABAPENTIN SCH MG: 300 CAPSULE ORAL at 21:28

## 2020-02-10 RX ADMIN — GABAPENTIN SCH MG: 300 CAPSULE ORAL at 13:58

## 2020-02-10 RX ADMIN — Medication SCH UNIT: at 21:28

## 2020-02-10 RX ADMIN — LEVOTHYROXINE SODIUM SCH MCG: 25 TABLET ORAL at 05:41

## 2020-02-10 NOTE — PN
PROGRESS NOTE



An 79-year-old lady that is admitted to hospital with anterior wall myocardial

infarction, underwent cardiac catheterization and angioplasty of LAD.  She is feeling

well.  Denies any episodes of chest pain.  She is on Eliquis 2.5 b.i.d., aspirin,

Lipitor, Lasix that I am starting her on today, Lopressor 25 b.i.d., and Synthroid.



PHYSICAL EXAMINATION:

On exam, patient is comfortable at rest.  Vital signs are stable.

There is no jugular venous distention.

Chest exam reveals good air entry bilaterally.

Heart exam reveals first and second heart sounds.  No gallop. Has an ejection systolic

murmur in the aortic area.

Abdomen is soft.

Exam of extremities did not reveal any edema.  Peripheral pulses are felt.



The patient is currently on aspirin, Eliquis, Lipitor, Lasix, and Lopressor.



ASSESSMENT:

Acute anterior wall myocardial infarction, status post catheterization and angioplasty

of LAD.



PLAN:

I am going to review the echo results once they are available and continue current

medications.  We should be able to discharge her home tomorrow.





MMODL / IJN: [id number] / Job#: [id number]

## 2020-02-10 NOTE — ECHOF
Referral Reason:Recent stent placement



MEASUREMENTS

--------

HEIGHT: 132.1 cm

WEIGHT: 59.0 kg

BP: 

IVSd:   0.9 cm     (0.6 - 1.1)

LVIDd:   4.2 cm     (3.9 - 5.3)

LVPWd:   1.1 cm     (0.6 - 1.1)

IVSs:   0.9 cm

LVIDs:   3.3 cm

LVPWs:   1.2 cm

LAESV Index (A-L):   27.60 ml/m

Ao Diam:   2.7 cm     (2.0 - 3.7)

AV Cusp:   1.4 cm     (1.5 - 2.6)

LA Diam:   2.9 cm     (2.7 - 3.8)

MV EXCURSION:   19.783 mm     (> 18.000)

MV EF SLOPE:   101 mm/s     (70 - 150)

EPSS:   2.6 cm

MV E Milton:   1.09 m/s

MV DecT:   163 ms

MV A Milton:   1.33 m/s

MV E/A Ratio:   0.82 

AR PHT:   315 ms

RAP:   5.00 mmHg

RVSP:   70.62 mmHg

TAPSE:   23.60 mm







FINDINGS

--------

Sinus rhythm.

This was a technically good study.

The left ventricular size is normal.   Left ventricular wall thickness is normal.   Overall left vent
ricular systolic function is moderate-severely impaired with, an EF between 30 - 35 %.   Increased LA
P stage 3 Diastolic Dysfunction.   Mid inferoseptal LV wall motion is hypokinetic.    Apical anterior
 LV wall motion is hypokinetic.    Apical lateral LV wall motion is hypokinetic.    Apical inferior L
V wall motion is hypokinetic.    Apical septum LV wall motion is hypokinetic.    Septal Hypokinesis

The right ventricle is normal in size.

The left atrial size is normal.    Normal LA  size by volume 22+/-6 ml/m2.

The right atrial size is normal.

Aortic valve is trileaflet and is mildly thickened.   There is mild aortic regurgitation.

The mitral valve is normal.   The mitral valve leaflets are mildly thickened.   Mild mitral annular c
alcification present.   Moderate-to-severe mitral regurgitation is present.

The tricuspid valve appears structurally normal.   Severe tricuspid regurgitation present.   There is
 severe pulmonary hypertension.   The right ventricular systolic pressure, as measured by Doppler, is
 70.62mmHg.

There is no pulmonic regurgitation present.

The aortic root size is normal.

Normal inferior vena cava with normal inspiratory collapse consistent with estimated right atrial pre
ssure of  5 mmHg.

There is no pericardial effusion.



CONCLUSIONS

--------

1. Sinus rhythm.

2. This was a technically good study.

3. The left ventricular size is normal.

4. Left ventricular wall thickness is normal.

5. Increased LAP stage 3 Diastolic Dysfunction.

6. Mid inferoseptal LV wall motion is hypokinetic.

7. Apical anterior LV wall motion is hypokinetic.

8. Apical lateral LV wall motion is hypokinetic.

9. Apical inferior LV wall motion is hypokinetic.

10. Apical septum LV wall motion is hypokinetic.

11. Septal Hypokinesis

12. The right ventricle is normal in size.

13. The left atrial size is normal.

14. Normal LA size by volume 22+/-6 ml/m2.

15. The right atrial size is normal.

16. Aortic valve is trileaflet and is mildly thickened.

17. There is mild aortic regurgitation.

18. The mitral valve is normal.

19. The mitral valve leaflets are mildly thickened.

20. Mild mitral annular calcification present.

21. Moderate-to-severe mitral regurgitation is present.

22. The tricuspid valve appears structurally normal.

23. Severe tricuspid regurgitation present.

24. There is severe pulmonary hypertension.

25. The right ventricular systolic pressure, as measured by Doppler, is 70.62mmHg.

26. There is no pulmonic regurgitation present.

27. The aortic root size is normal.

28. Normal inferior vena cava with normal inspiratory collapse consistent with estimated right atrial
 pressure of  5 mmHg.

29. There is no pericardial effusion.





SONOGRAPHER: Charlene Delarosa RDCS

## 2020-02-10 NOTE — P.CONS
History of Present Illness





- Reason for Consult


Consult date: 02/10/20


thrombocytopenia


Requesting physician: Adeel Bell





- Chief Complaint


AMI





- History of Present Illness





Mrs. Heller is a very pleasant 79-year-old  female patient of Dr. Bill who was due to be seen tomorrow by Hematology for suspect myelodysplasia. 

Patient unfortunately, last Friday, began having symptoms of chest pain and 

shortness of breath, troponin levels were elevated when she came to [facility], she 

was transferred to [facility], she is now status post cardiac catheterization 

with bare-metal stenting.  She is on dual antiplatelet therapy, she is on 

Eliquis 2.5 mg twice a day for atrial fibrillation.


Pt denied prior Hx of anemia or low plt, unusual bruising, petechiae, 

hemoptysis, hematemesis, hematuria, hematochezia, melena, hemorrhoids.  She has 

a history of chemo and surgery (gastric pull through) for esophageal cancer, t

reated in 1998 by Dr. Stoner and Dr. Haque, no recurrence.  She also has a 

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  She is 

otherwise is decent health, fairly active. 





Review of Systems





14 point ROS is negative except as stated in HPI   





Past Medical History


Past Medical History: Atrial Fibrillation, Coronary Artery Disease (CAD), 

Cancer, Chest Pain / Angina, Hypertension, Myocardial Infarction (MI), Ost

eoarthritis (OA), Thyroid Disorder


Additional Past Medical History / Comment(s): " LEAKY VALVES", ESOPHAGEAL 

CANCER,  DECREASED KIDNEY FUNCTION, CKD, ischemic bowel, Raynauds, Cardiac cath 

with stents.


Last Myocardial Infarction Date:: 2/8/20


History of Any Multi-Drug Resistant Organisms: None Reported


Past Surgical History: Appendectomy, Cholecystectomy, Heart Catheterization, 

Hysterectomy


Additional Past Surgical History / Comment(s): ESOPHAGUS SURGERY, FOOT SURGERY, 

LEFT KNEE SURGERY, PARATHYROID SURGERY, BILATERAL WRIST, colectory with ostomy 

placement.


Past Anesthesia/Blood Transfusion Reactions: No Reported Reaction


Past Psychological History: No Psychological Hx Reported


Smoking Status: Former smoker


Past Alcohol Use History: Rare


Additional Past Alcohol Use History / Comment(s): STARTED SMOKING AT AGE 13 QUIT

SMOKING AT AGE 53 SMOKED 2-3PPD


Past Drug Use History: None Reported





- Past Family History


  ** Mother


Family Medical History: Cancer (Kidney)





  ** Father


Family Medical History: Cancer (Mouth cancer at 84)





  ** Daughter(s)


Family Medical History: Cancer (Lung cancer)





  ** Brother(s)


Family Medical History: No Reported History





  ** Sister(s)


Family Medical History: No Reported History





Medications and Allergies


                                Home Medications











 Medication  Instructions  Recorded  Confirmed  Type


 


Cinnamon Bark [Cinnamon] 500 mg PO DAILY 10/04/16 02/08/20 History


 


Ferrous Sulfate [Feosol] 325 mg PO BID 10/04/16 02/08/20 History


 


Furosemide [Lasix] 20 mg PO Q48H PRN 10/04/16 02/08/20 History


 


Gabapentin [Neurontin] 300 mg PO BID@0800,1200 10/04/16 02/08/20 History


 


Levothyroxine Sodium [Synthroid] 25 mcg PO DAILY 10/04/16 02/08/20 History


 


Magnesium Gluconate [Magonate] 500 mg PO BID 10/04/16 02/08/20 History


 


Multivitamins, Thera [Multivitamin] 1 tab PO DAILY 10/04/16 02/08/20 History


 


Vitamin B Complex 1 cap PO DAILY 10/04/16 02/08/20 History


 


Apixaban [Eliquis] 2.5 mg PO BID 02/08/20 02/08/20 History


 


Calcium Carbonate [Calcium] 1,200 mg PO BID 02/08/20 02/08/20 History


 


Cholecalciferol [Vitamin D3 (25 2,000 unit PO BID 02/08/20 02/08/20 History





Mcg = 1000 Iu)]    


 


Cranberry Fruit Extract [Cranberry] 500 mg PO DAILY 02/08/20 02/08/20 History


 


Gabapentin [Neurontin] 600 mg PO HS 02/08/20 02/08/20 History


 


Krill Oil 500 mg PO DAILY 02/08/20 02/08/20 History


 


Lecithin  200 mg PO DAILY 02/08/20 02/08/20 History


 


Propranolol HCl [Propranolol HCl 60 mg PO BID 02/08/20 02/08/20 History





ER]    


 


oxyCODONE HCL [oxyCODONE HCL (IR)] 10 mg PO Q4H PRN 02/08/20 02/08/20 History








                                    Allergies











Allergy/AdvReac Type Severity Reaction Status Date / Time


 


ciprofloxacin [From Cipro] Allergy  Nausea & Verified 02/08/20 14:23





   Vomiting  


 


Penicillins Allergy  Anaphylaxis Verified 02/08/20 14:23


 


venom-honey bee Allergy  Anaphylaxis Verified 02/08/20 14:23





[bee venom (honey bee)]     


 


venom-wasp [wasp venom] Allergy  Anaphylaxis Verified 02/08/20 14:23














Physical Exam


Vitals: 


                                   Vital Signs











  Temp Pulse Resp BP Pulse Ox


 


 02/10/20 08:00  97.6 F  83  16  116/56  96


 


 02/10/20 04:00  98.8 F  77  13  104/59  98


 


 02/10/20 03:00   80  14  85/52  97


 


 02/10/20 02:00   79  13  110/64  98


 


 02/10/20 01:00   82  16  110/64  96


 


 02/10/20 00:18      92 L


 


 02/10/20 00:00  98.0 F  86  16  96/57  93 L


 


 02/09/20 23:00   79  15  96/57  92 L


 


 02/09/20 22:00   78  17  114/61  89 L


 


 02/09/20 21:00   86  16  114/61  92 L


 


 02/09/20 20:37   93  22  114/61  92 L


 


 02/09/20 20:00  97.6 F  94  17  114/61  91 L


 


 02/09/20 19:18      92 L


 


 02/09/20 16:00  98.0 F  76  13  99/58  94 L


 


 02/09/20 15:00   82  13   92 L


 


 02/09/20 14:00   84  18   93 L


 


 02/09/20 13:00   76  20  93/54  95


 


 02/09/20 12:00  98 F  68  15  93/54  95








                                Intake and Output











 02/09/20 02/10/20 02/10/20





 22:59 06:59 14:59


 


Intake Total 640 200 480


 


Output Total 310 360 140


 


Balance 330 -160 340


 


Intake:   


 


  Oral 640 200 480


 


Output:   


 


  Urine 160 260 140


 


  Stool 150 100 


 


Other:   


 


  Voiding Method Indwelling Catheter Indwelling Catheter 


 


  Weight  61.6 kg 














- Constitutional


General appearance: average body habitus, cooperative, no acute distress





- EENT


Eyes: anicteric sclerae, EOMI


ENT: hearing grossly normal, normal oropharynx





- Neck


Neck: no lymphadenopathy





- Respiratory


Respiratory: bilateral: CTA





- Cardiovascular





venous prominence on the left chest and LUE, torturous veins 


Rhythm: regular


Heart sounds: normal: S1, S2


Abnormal Heart Sounds: no systolic murmur, no diastolic murmur, no rub, no S3 

Gallop, no S4 Gallop, no click, no other


  ** leg


Peripheral Edema: bilateral: None





- Gastrointestinal





LLQ ostomy


General gastrointestinal: no absent bowel sounds, decreased bowel sounds, no dis

tended, no hepatomegaly, no hyperactive bowel sounds, no normal bowel sounds, no

organomegaly, no rigid, no scaphoid, soft, no splenomegaly, no tenderness, no 

umbilical hernia, no ventral hernia





- Integumentary





ecchymosis in known areas of trauma


Integumentary: pale





- Neurologic





generalized tremor


Neurologic: CNII-XII intact





- Musculoskeletal


Musculoskeletal: strength equal bilaterally





- Psychiatric


Psychiatric: A&O x's 3, appropriate affect, intact judgment & insight





Results


CBC & Chem 7: 


                                 02/10/20 05:11





                                 02/10/20 05:11


Labs: 


                  Abnormal Lab Results - Last 24 Hours (Table)











  02/10/20 02/10/20 Range/Units





  05:11 05:11 


 


RBC   2.84 L  (3.80-5.40)  m/uL


 


Hgb   9.6 L  (11.4-16.0)  gm/dL


 


Hct   30.9 L  (34.0-46.0)  %


 


MCV   108.7 H  (80.0-100.0)  fL


 


Plt Count   86 L  (150-450)  k/uL


 


Lymphocytes #   0.7 L  (1.0-4.8)  k/uL


 


Macrocytosis   Marked A  


 


Sodium  136 L   (137-145)  mmol/L


 


Potassium  5.2 H   (3.5-5.1)  mmol/L


 


Chloride  115 H   ()  mmol/L


 


Carbon Dioxide  17 L   (22-30)  mmol/L


 


BUN  38 H   (7-17)  mg/dL


 


Creatinine  1.43 H   (0.52-1.04)  mg/dL


 


Calcium  7.8 L   (8.4-10.2)  mg/dL


 


AST  54 H   (14-36)  U/L


 


Total Protein  4.5 L   (6.3-8.2)  g/dL


 


Albumin  2.3 L   (3.5-5.0)  g/dL














Assessment and Plan


(1) Thrombocytopenia


Current Visit: Yes   Status: Acute   Priority: High   Code(s): D69.6 - 

THROMBOCYTOPENIA, UNSPECIFIED   SNOMED Code(s): 916315970


   





(2) Macrocytic anemia


Current Visit: Yes   Status: Chronic   Priority: Medium   Code(s): D53.9 - 

NUTRITIONAL ANEMIA, UNSPECIFIED   SNOMED Code(s): 60970383


   


Plan: 





Anemia, thrombocytopenia, work up ordered, iron studies and for paraproteinemia.

 


From chart review anemia is stable.  Transfuse for Hgb <7.  Platelets are low 

but, up today from yesterday, intermittent decrease over the last 6 months, 

worse now then previous.  Transfuse if symptomatic or plt<50,000.  


No transfusions needed at this time.


CBC daily while inpatient


Ok for antiplatelet/anticoagulation therapy at this time.  I see from Cardiology

note they are defining duration of dual antiplatelet therapy to reduce the 

duration of bleeding risk to pt.  Platelets need to be >50,000 to continue.  CBC

while inpatient, would recommend close CBC f/u outpatient.  


Pt education on bleeding s/s, risks, safety.


Will f/u on labs.

## 2020-02-10 NOTE — P.PN
Subjective


Progress Note Date: 02/10/20








This 89-year-old pleasant female patient of Dr. tijerina.  She has underlying 

history of atrial fibrillation, CK D stage III, hypothyroidism, hyperlipidemia, 

rheumatoid arthritis osteoporosis and ischemic bowel disease, requiring 

colostomy.  She has had ear pain for quite a while now and is being worked up by

Dr. Tijerina, and Dr. Berg with no pathology is found.  Patient also has 

uncontrolled tremors, which is chronic and unsteady gait, presenting to [facility] secondary to chest pain radiating to the jaw, and shortness

of breath and exertion.  Unsteady gait.  Lightheadedness.  No dizziness.  

Patient denies any falls, patient lives independently at home.  Patient denies 

melena and hematochezia, no abdominal pain,





While at the emergency room of [facility], her troponin 

was elevated with a peak troponin of 9.4 was started on IV heparin, statins, 

beta blockers, aspirin for conservative management of an STEMI however she 

continued to have chest pain thereafter was transferred from the ICU to the Cath

Lab at [facility] on with subsequent admission to the ICU..  Patient 

was interrogated with a heart cath, has critical disease of the left anterior 

descending artery mid location, requiring a bare metal stent placement on 

02/08/2020 by Dr. Knight, for which recommendation was to antiplatelet therapy 

for 4 weeks, along with this modification,





2/9: Patient remains in the intensive care unit waiting for a selective care 

bed.  The patient denies having any chest pain, no ear pain this has resolved.  

Left groin without any bleeding or bruit hematoma.  A repeat lab work reveals of

potassium of 5.6 and one dose of Kayexalate ordered.  BUN is 44 and creatinine 

1.5, hemoglobin 9.9 and platelet count 67.  Patient is scheduled for 

echocardiogram.  Repeat lab work in the morning.





2/10: Patient remains in intensive care unit waiting for selective care bed.  

Patient denies any chest pain, shortness of breath, no ear pain.  She has been 

afebrile, heart rate 83, blood pressure 116/56, pulse ox 96% on 2 L nasal 

cannula.  Repeat blood work reveals BUN of 38 and creatinine 1.43, potassium 

5.2, sodium 136, chloride 115, CO2 17, hemoglobin 9.6, platelet count 86.  AST 

54.  TSH 0.288 and free T4 1 0.46.  Patient has been seen by oncology for 

thrombocytopenia and anemia.  Patient is okay for antiplatelet anticoagulation 

at this time.  Platelets need to be greater than 50,000 to continue.  Follow up 

as an outpatient. Abdominal ultrasound revealscommon bile duct dilatation and 

intrahepatic biliary ductal dilatation with obstruction of the pancreatic head. 

Diffuse heterogenetic of the hepatic parenchyma correlate with liver function 

test.  Atrophic kidneys with few left renal cyst appearing benign.  Partially 

visualized small bilateral pleural effusions.  No evidence of spinal megaly nor 

hepatomegaly.  Cardiology is planning on eliquis 2.5 mg twice daily, aspirin and

she started Lasix today.  Plan is for discharge tomorrow.  Patient states that 

she is going to [facility] for subacute rehab.














Review Of Systems:


Constitutional: No fever, no chills, no night sweats.  No weight change.  

Generalized weakness.  No daytime sleepiness.


EENT: No headache.  No blurred vision or double vision, no loss of vision.  No 

loss of Hearing, no ringing in the ears, no dizziness.  No nasal drainage or 

congestion.  No epistaxis.  No sore throat.


Lungs: No shortness of breath, cough, no sputum production.  No wheezing.


Cardiovascular: No chest pain, no lower extremity edema.  No palpitations.  No 

paroxysmal nocturnal dyspnea.  No orthopnea.  No lightheadedness or dizziness.  

No syncopal episodes.


Abdominal: No abdominal pain.  No nausea, vomiting.  No diarrhea.  No 

constipation.  No bloody or tarry stools..  No loss of appetite.


Genitourinary: No dysuria, increased frequency, urgency.  No urinary retention.


Musculoskeletal: No myalgias.  Reports muscle weakness, no gait dysfunction, no 

frequent falls.  No back pain.  No neck pain.


Integumentary: No wounds, no lesions.  No rash or pruritus.  No unusual 

bruising.  No change in hair or nails.


Neurologic: No aphasia. No facial droop. No change in mentation. No head injury.

No headache. No paralysis. No paresthesia.


Psychiatric: No depression.  No anxiety.  No mood swings.


Endocrine: No abnormal blood sugars.  No weight change.  No excessive sweating 

or thirst.    








Objective





- Vital Signs


Vital signs: 


                                   Vital Signs











Temp  97.6 F   02/10/20 08:00


 


Pulse  83   02/10/20 08:00


 


Resp  16   02/10/20 08:00


 


BP  116/56   02/10/20 08:00


 


Pulse Ox  96   02/10/20 08:00








                                 Intake & Output











 02/09/20 02/10/20 02/10/20





 18:59 06:59 18:59


 


Intake Total 690 600 480


 


Output Total 925 560 140


 


Balance -235 40 340


 


Weight  61.6 kg 


 


Intake:   


 


    


 


    Sodium Chloride 0.9% 1, 150  





    000 ml @ 75 mls/hr IV .   





    [id number] JOSE Rx#:[id number]   


 


  Intake, IV Titration 300  





  Amount   


 


    Sodium Chloride 0.9% 1, 300  





    000 ml @ 75 mls/hr IV .   





    [id number] JOSE Rx#:[id number]   


 


  Oral 240 600 480


 


Output:   


 


  Urine 275 310 140


 


  Stool 650 250 


 


Other:   


 


  Voiding Method Indwelling Catheter Indwelling Catheter 














- Exam





- Constitutional


General appearance: average body habitus, cooperative, no acute distress, p

atient is resting in chair





- EENT


Eyes: anicteric sclerae, EOMI, PERRLA, dentition normal


ENT: NA/AT, normal oropharynx





- Neck


Neck: normal ROM





- Respiratory


Respiratory: bilateral: CTA, negative: diminished, dullness, rales





- Cardiovascular


Rhythm: regular


Heart sounds: normal: S1, S2


Abnormal Heart Sounds: no systolic murmur, no diastolic murmur, no rub, no S3 

Gallop, no S4 Gallop, no click, no other





- Gastrointestinal


General gastrointestinal: normal bowel sounds, soft





- Integumentary


Integumentary: decreased turgor, normal





- Neurologic


Neurologic: CNII-XII intact





- Musculoskeletal





Tremors


Musculoskeletal: strength equal bilaterally





- Psychiatric


Psychiatric: A&O x's 3, appropriate affect, intact judgment & insight





- Labs


CBC & Chem 7: 


                                 02/10/20 05:11





                                 02/10/20 05:11


Labs: 


                  Abnormal Lab Results - Last 24 Hours (Table)











  02/10/20 02/10/20 Range/Units





  05:11 05:11 


 


RBC   2.84 L  (3.80-5.40)  m/uL


 


Hgb   9.6 L  (11.4-16.0)  gm/dL


 


Hct   30.9 L  (34.0-46.0)  %


 


MCV   108.7 H  (80.0-100.0)  fL


 


Plt Count   86 L  (150-450)  k/uL


 


Lymphocytes #   0.7 L  (1.0-4.8)  k/uL


 


Macrocytosis   Marked A  


 


Sodium  136 L   (137-145)  mmol/L


 


Potassium  5.2 H   (3.5-5.1)  mmol/L


 


Chloride  115 H   ()  mmol/L


 


Carbon Dioxide  17 L   (22-30)  mmol/L


 


BUN  38 H   (7-17)  mg/dL


 


Creatinine  1.43 H   (0.52-1.04)  mg/dL


 


Calcium  7.8 L   (8.4-10.2)  mg/dL


 


AST  54 H   (14-36)  U/L


 


Total Protein  4.5 L   (6.3-8.2)  g/dL


 


Albumin  2.3 L   (3.5-5.0)  g/dL














Assessment and Plan


Plan: 





1.  Acute NSTEMI, requiring bare-metal stent to the mid LAD performed on 

02/08/2020 by Dr. Gauthier, with recommendations for dual antiplatelet therapy for 

4 weeks, risk modification, statin and aspirin/Plavix we will discontinue 

propranolol, and replace it with metoprolol 25 twice a day.  Transfer to cardiac

stepdown unit





2.  Rheumatoid arthritis, on gabapentin 600 mg at bedtime and 300 mg twice a 

day, not on DMARD's or prednisone





3  Paroxysmal atrial fibrillation on eliquis 2.5 mg twice a day





4.  CK D stage III, patient's creatinine was 1.9, with IV hydration, monitor for

contrast nephropathy, being monitored in a.m.





5.  Anemia chronic disease with hemoglobin of 10.3





6.  Prior history of ischemic bowel disease requiring colostomy placement and 

hemicolectomy





7.  Chronic ear pain, bilateral severe, this has resolved after stent placement 

on the mid LAD





8.  Benign tremors





9.  Severe malnutrition facial supplementation





10.  Hypothyroidism on levothyroxine 25 g daily





10.  Chronic pain on oxycodone 10 mg every 4 when necessary per home dose by PCP





11.  Hyperkalemia most likely related to chronic kidney disease.  Kayexalate 1 

dose ordered.





12.  Thrombocytopenia.  Oncology consult appreciated.





Discharge plan: [facility] tomorrow





Impression and plan of care have been directed as dictated by the signing 

physician.  Leena Barragan nurse practitioner acting as scribe for signing 

physician.

## 2020-02-10 NOTE — US
EXAMINATION TYPE: US abdomen complete

 

DATE OF EXAM: 2/10/2020

 

COMPARISON: NONE

 

CLINICAL HISTORY: thrombocytopenia. Assess liver and spleen size per order; gallbladder and colon rem
shantel/patient has ileostomy

 

EXAM MEASUREMENTS:

 

Liver Length:  11.7 cm   

Gallbladder Wall:  surgically removed   

CBD:  1.2 cm

Spleen:  9.3 x 9.7 x 5.2 cm   

Right Kidney:  8.5 x 4.7 x 3.9 cm 

Left Kidney:  7.5 x 4.3 x 3.1 cm   

 

 

 

Pancreas:  Pancreatic head and tail obscured by overlying bowel gas

Liver:  multiple, intrahepatic dilated bile ducts noted. Heterogenous parenchyma throughout. 

Gallbladder:  surgically removed

**Evidence for sonographic Addison's sign:  RUQ tenderness verbalized by patient

CBD:  abnormally dilated for 8th decade and post cholecystectomy 

Spleen:  wnl   

Right Kidney:  small for size   

Left Kidney:  small for size; couple of mid cortical cysts with larger =1.7 x 1.7 x 1.6cm; thin mitzy
x     

Upper IVC:  wnl  

Abd Aorta:  ectatic appearance noted mid and distally 

 

Bilateral pleural effusions are noted.

Patient stated had CT abdomen and US of abdomen at [facility]

 

 

IMPRESSION: 

1. Common bile duct dilatation and intrahepatic biliary ductal dilatation with obscuration of the pan
creatic head. Patient describes outside prior CT. This could be obtained. If unobtainable repeat abdo
albino ultrasound with contrast would be recommended to assess for pancreatic head mass.

2. Diffuse heterogeneity of the hepatic parenchyma. Correlate with liver function tests.

3. Atrophic size of the kidneys with few left renal cysts appearing benign and associated type I.

4. Partially visualized at least small bilateral pleural effusions.

5. No evidence of splenomegaly nor hepatomegaly.

## 2020-02-11 VITALS — SYSTOLIC BLOOD PRESSURE: 114 MMHG | RESPIRATION RATE: 18 BRPM | DIASTOLIC BLOOD PRESSURE: 51 MMHG

## 2020-02-11 VITALS — TEMPERATURE: 97.6 F

## 2020-02-11 VITALS — HEART RATE: 84 BPM

## 2020-02-11 LAB
ALBUMIN SERPL ELPH-MCNC: 2.22 G/DL (ref 3.8–4.9)
ANION GAP SERPL CALC-SCNC: 6 MMOL/L
BASOPHILS # BLD AUTO: 0 K/UL (ref 0–0.2)
BASOPHILS NFR BLD AUTO: 1 %
BUN SERPL-SCNC: 39 MG/DL (ref 7–17)
CALCIUM SPEC-MCNC: 8.2 MG/DL (ref 8.4–10.2)
CHLORIDE SERPL-SCNC: 111 MMOL/L (ref 98–107)
CO2 SERPL-SCNC: 19 MMOL/L (ref 22–30)
EOSINOPHIL # BLD AUTO: 0.1 K/UL (ref 0–0.7)
EOSINOPHIL NFR BLD AUTO: 1 %
ERYTHROCYTE [DISTWIDTH] IN BLOOD BY AUTOMATED COUNT: 2.85 M/UL (ref 3.8–5.4)
ERYTHROCYTE [DISTWIDTH] IN BLOOD: 13.8 % (ref 11.5–15.5)
GAMMA GLOB SERPL ELPH-MCNC: 0.6 G/DL (ref 0.7–1.5)
GLUCOSE SERPL-MCNC: 115 MG/DL (ref 74–99)
HCT VFR BLD AUTO: 30.8 % (ref 34–46)
HGB BLD-MCNC: 9.6 GM/DL (ref 11.4–16)
KAPPA LC FREE SER NEPH-MCNC: 4.14 MG/DL (ref 0.33–1.94)
LYMPHOCYTES # SPEC AUTO: 0.5 K/UL (ref 1–4.8)
LYMPHOCYTES NFR SPEC AUTO: 11 %
MCH RBC QN AUTO: 33.6 PG (ref 25–35)
MCHC RBC AUTO-ENTMCNC: 31.1 G/DL (ref 31–37)
MCV RBC AUTO: 107.9 FL (ref 80–100)
MONOCYTES # BLD AUTO: 0.3 K/UL (ref 0–1)
MONOCYTES NFR BLD AUTO: 7 %
NEUTROPHILS # BLD AUTO: 3.7 K/UL (ref 1.3–7.7)
NEUTROPHILS NFR BLD AUTO: 78 %
PLATELET # BLD AUTO: 82 K/UL (ref 150–450)
POTASSIUM SERPL-SCNC: 4.8 MMOL/L (ref 3.5–5.1)
SODIUM SERPL-SCNC: 136 MMOL/L (ref 137–145)
WBC # BLD AUTO: 4.7 K/UL (ref 3.8–10.6)

## 2020-02-11 RX ADMIN — GABAPENTIN SCH MG: 300 CAPSULE ORAL at 12:43

## 2020-02-11 RX ADMIN — LEVOTHYROXINE SODIUM SCH MCG: 25 TABLET ORAL at 05:41

## 2020-02-11 RX ADMIN — FUROSEMIDE SCH MG: 20 TABLET ORAL at 09:02

## 2020-02-11 RX ADMIN — GABAPENTIN SCH MG: 300 CAPSULE ORAL at 09:03

## 2020-02-11 RX ADMIN — Medication SCH MG: at 09:03

## 2020-02-11 RX ADMIN — METOPROLOL TARTRATE SCH MG: 25 TABLET, FILM COATED ORAL at 09:03

## 2020-02-11 RX ADMIN — LORATADINE SCH MG: 10 TABLET ORAL at 09:03

## 2020-02-11 RX ADMIN — CLOPIDOGREL BISULFATE SCH MG: 75 TABLET ORAL at 09:02

## 2020-02-11 RX ADMIN — ASPIRIN 325 MG ORAL TABLET SCH MG: 325 PILL ORAL at 09:02

## 2020-02-11 RX ADMIN — THERA TABS SCH EACH: TAB at 09:02

## 2020-02-11 RX ADMIN — Medication SCH UNIT: at 09:02

## 2020-02-11 RX ADMIN — APIXABAN SCH MG: 2.5 TABLET, FILM COATED ORAL at 09:02

## 2020-02-11 NOTE — PN
PROGRESS NOTE



Courtney is an 79-year-old lady who was admitted to the hospital with anterior wall

myocardial infarction.  She underwent cardiac catheterization and angioplasty of LAD.

This morning the patient is doing well, free of symptoms and is currently in the

process of being sent to a nursing home.  An echocardiogram showed an ejection fraction

of 30% to 35% with severe pulmonary hypertension.  She has evidence of myocardial

infarction in LAD distribution.



LABS:

Labs show hemoglobin of 9.6.  Potassium is 4.8, creatinine is 1.6.



Current medications include aspirin, Eliquis, Lipitor, Plavix, and Lopressor.



PHYSICAL EXAMINATION:

On exam, comfortable at rest.

Vital signs are stable.

There is no jugular venous distention.

Chest exam reveals good air entry bilaterally.

Heart reveals first and second heart sounds.  No gallop.

Examination of the extremities did not reveal any edema.  Peripheral pulses are felt.



ASSESSMENT:

1. Status post anterior wall myocardial infarction.

2. Ischemic cardiomyopathy.



PLAN:

The patient will be treated with optimal medical therapy and discharged home later

today.





MMODL / IJN: [id number] / Job#: [id number]

## 2020-02-11 NOTE — P.DS
Providers


Date of admission: 


02/08/20 10:19





Expected date of discharge: 02/11/20


Attending physician: 


Myrna Tijerina





Consults: 





                                        





02/08/20 11:27


Consult Physician Routine 


   Consulting Provider: [facility]


   Consult Reason/Comments: Post Interventional patient


   Do you want consulting provider notified?: Already Contacted





02/10/20 09:21


Consult Physician Routine 


   Consulting Provider: Darryl Cali


   Consult Reason/Comments: thrombocytopenia


   Do you want consulting provider notified?: Yes











Primary care physician: 


Myrna Tijerina





Hospital Course: 





This 89-year-old pleasant female patient of Dr. tijerina.  She has underlying 

history of atrial fibrillation, CK D stage III, hypothyroidism, hyperlipidemia, 

rheumatoid arthritis osteoporosis and ischemic bowel disease, requiring 

colostomy.  She has had ear pain for quite a while now and is being worked up by

Dr. Tijerina, and Dr. Berg with no pathology is found.  Patient also has 

uncontrolled tremors, which is chronic and unsteady gait, presenting to [facility] secondary to chest pain radiating to the jaw, and shortness

of breath and exertion.  Unsteady gait.  Lightheadedness.  No dizziness.  

Patient denies any falls, patient lives independently at home.  Patient denies 

melena and hematochezia, no abdominal pain,





While at the emergency room of [facility], her troponin 

was elevated with a peak troponin of 9.4 was started on IV heparin, statins, 

beta blockers, aspirin for conservative management of an STEMI however she 

continued to have chest pain thereafter was transferred from the ICU to the Cath

Lab at [facility] on with subsequent admission to the ICU..  Patient 

was interrogated with a heart cath, has critical disease of the left anterior 

descending artery mid location, requiring a bare metal stent placement on 

02/08/2020 by Dr. Knight, for which recommendation was to antiplatelet therapy 

for 4 weeks, along with this modification,





2/9: Patient remains in the intensive care unit waiting for a selective care 

bed.  The patient denies having any chest pain, no ear pain this has resolved.  

Left groin without any bleeding or bruit hematoma.  A repeat lab work reveals of

potassium of 5.6 and one dose of Kayexalate ordered.  BUN is 44 and creatinine 

1.5, hemoglobin 9.9 and platelet count 67.  Patient is scheduled for 

echocardiogram.  Repeat lab work in the morning.





2/10: Patient remains in intensive care unit waiting for [facility] care bed.  

Patient denies any chest pain, shortness of breath, no ear pain.  She has been 

afebrile, heart rate 83, blood pressure 116/56, pulse ox 96% on 2 L nasal 

cannula.  Repeat blood work reveals BUN of 38 and creatinine 1.43, potassium 

5.2, sodium 136, chloride 115, CO2 17, hemoglobin 9.6, platelet count 86.  AST 

54.  TSH 0.288 and free T4 1 0.46.  Patient has been seen by oncology for 

thrombocytopenia and anemia.  Patient is okay for antiplatelet anticoagulation 

at this time.  Platelets need to be greater than 50,000 to continue.  Follow up 

as an outpatient. Abdominal ultrasound revealscommon bile duct dilatation and 

intrahepatic biliary ductal dilatation with obstruction of the pancreatic head. 

Diffuse heterogenetic of the hepatic parenchyma correlate with liver function 

test.  Atrophic kidneys with few left renal cyst appearing benign.  Partially 

visualized small bilateral pleural effusions.  No evidence of spinal megaly nor 

hepatomegaly.  Cardiology is planning on eliquis 2.5 mg twice daily, aspirin and

she started Lasix today.  Plan is for discharge tomorrow.  Patient states that 

she is going to [facility] for subacute rehab.





2/11: Patient has been afebrile, heart rate 85, blood pressure 111/55, pulse ox 

96%.  Repeat blood work reveals WBC 4.7, hemoglobin 9.6, platelet count 82.  

Sodium 136, potassium 4.8, chloride 111, CO2 19, BUN 39 creatinine 1.67.  Iron 

17, TIBC 210, iron saturation 8.1, ferritin 254.8, PEP 4.1, vitamin B12 1177.  

Free kappa, free lambda, Immunofixation, methylmalonic acid are pending.  He has

been cleared for discharge by cardiology.  Patient will be discharged [facility] 

once all arrangements are completed.











Discharge diagnoses:


1.  Acute NSTEMI, requiring bare-metal stent to the mid LAD performed on 

02/08/2020 by Dr. Gauthier, with recommendations for dual antiplatelet therapy for 

4 weeks, risk modification, statin and aspirin/Plavix 


2.  Rheumatoid arthritis


3   Paroxysmal atrial fibrillation


4.  CKD stage III


5.  Anemia chronic kidney disease


6.  Prior history of ischemic bowel disease requiring colostomy placement and 

hemicolectomy


7.  Chronic ear pain, bilateral severe, this has resolved after stent placement


8.  Benign tremors


9.  Severe protein calorie malnutrition


10.  Hypothyroidism 


10.  Chronic pain


11.  Hyperkalemia most likely related to chronic kidney disease.


12.  Thrombocytopenia.  





Discharge plan: [place] under the care of Dr. Tijerina





Impression and plan of care have been directed as dictated by the signing 

physician.  Leena Barragan nurse practitioner acting as scribe for signing 

physician.








Patient Condition at Discharge: Good





Plan - Discharge Summary


Discharge Rx Participant: Yes


New Discharge Prescriptions: 


New


   Loratadine [Claritin] 10 mg PO DAILY  tab


   Furosemide [Lasix] 20 mg PO DAILY  tab


   Atorvastatin [Lipitor] 80 mg PO HS  tab


   Metoprolol Tartrate [Lopressor] 25 mg PO BID  tab


   Gabapentin [Neurontin] 300 mg PO BID@0800,1200 #6 cap


   Nitroglycerin Sl Tabs [Nitrostat] 0.4 mg SUBLINGUAL Q5M PRN  tab


     PRN Reason: Chest Pain


   Clopidogrel [Plavix] 75 mg PO DAILY  tab


   Aspirin EC [Ecotrin Low Dose] 81 mg PO DAILY #30 tablet.


   Lisinopril [Zestril] 2.5 mg PO DAILY  tab





Continue


   Levothyroxine Sodium [Synthroid] 25 mcg PO DAILY


   Magnesium Gluconate [Magonate] 500 mg PO BID


   Ferrous Sulfate [Iron (65 MG Elemental)] 325 mg PO BID


   Vitamin B Complex 1 cap PO DAILY


   Multivitamins, Thera [Multivitamin (formulary)] 1 tab PO DAILY


   Apixaban [Eliquis] 2.5 mg PO BID


   Calcium Carbonate [Calcium] 1,200 mg PO BID


   Cholecalciferol [Vitamin D3 (25 Mcg = 1000 Iu)] 2,000 unit PO BID


   Gabapentin [Neurontin] 600 mg PO HS #3 cap


   oxyCODONE HCL [oxyCODONE HCL (IR)] 10 mg PO Q4H PRN #18 tab


     PRN Reason: Pain





Discontinued


   Furosemide [Lasix] 20 mg PO Q48H PRN


     PRN Reason: Edema


   Gabapentin [Neurontin] 300 mg PO BID@0800,1200


   Cinnamon Bark [Cinnamon] 500 mg PO DAILY


   Lecithin  200 mg PO DAILY


   Cranberry Fruit Extract [Cranberry] 500 mg PO DAILY


   Krill Oil 500 mg PO DAILY


   Propranolol HCl [Propranolol HCl ER] 60 mg PO BID


Discharge Medication List





Ferrous Sulfate [Iron (65 MG Elemental)] 325 mg PO BID 10/04/16 [History]


Levothyroxine Sodium [Synthroid] 25 mcg PO DAILY 10/04/16 [History]


Magnesium Gluconate [Magonate] 500 mg PO BID 10/04/16 [History]


Multivitamins, Thera [Multivitamin (formulary)] 1 tab PO DAILY 10/04/16 

[History]


Vitamin B Complex 1 cap PO DAILY 10/04/16 [History]


Apixaban [Eliquis] 2.5 mg PO BID 02/08/20 [History]


Calcium Carbonate [Calcium] 1,200 mg PO BID 02/08/20 [History]


Cholecalciferol [Vitamin D3 (25 Mcg = 1000 Iu)] 2,000 unit PO BID 02/08/20 

[History]


Aspirin EC [Ecotrin Low Dose] 81 mg PO DAILY #30 tablet.dr 02/11/20 [Rx]


Atorvastatin [Lipitor] 80 mg PO HS  tab 02/11/20 [Rx]


Clopidogrel [Plavix] 75 mg PO DAILY  tab 02/11/20 [Rx]


Furosemide [Lasix] 20 mg PO DAILY  tab 02/11/20 [Rx]


Gabapentin [Neurontin] 300 mg PO BID@0800,1200 #6 cap 02/11/20 [Rx]


Gabapentin [Neurontin] 600 mg PO HS #3 cap 02/11/20 [Rx]


Lisinopril [Zestril] 2.5 mg PO DAILY  tab 02/11/20 [Rx]


Loratadine [Claritin] 10 mg PO DAILY  tab 02/11/20 [Rx]


Metoprolol Tartrate [Lopressor] 25 mg PO BID  tab 02/11/20 [Rx]


Nitroglycerin Sl Tabs [Nitrostat] 0.4 mg SUBLINGUAL Q5M PRN  tab 02/11/20 [Rx]


oxyCODONE HCL [oxyCODONE HCL (IR)] 10 mg PO Q4H PRN #18 tab 02/11/20 [Rx]








Follow up Appointment(s)/Referral(s): 


Myrna Tijerina MD [Primary Care Provider] - 1 Week


Adeel Bell MD [STAFF PHYSICIAN] - 1 Week


Activity/Diet/Wound Care/Special Instructions: 


Give Lasix at Noon


Discharge Disposition: TRANSFER TO SNF/ECF

## 2021-05-10 ENCOUNTER — HOSPITAL ENCOUNTER (INPATIENT)
Dept: HOSPITAL 47 - EC | Age: 81
LOS: 8 days | DRG: 871 | End: 2021-05-18
Attending: INTERNAL MEDICINE | Admitting: INTERNAL MEDICINE
Payer: MEDICARE

## 2021-05-10 VITALS — BODY MASS INDEX: 16.7 KG/M2

## 2021-05-10 DIAGNOSIS — L03.312: ICD-10-CM

## 2021-05-10 DIAGNOSIS — Z87.891: ICD-10-CM

## 2021-05-10 DIAGNOSIS — Z88.0: ICD-10-CM

## 2021-05-10 DIAGNOSIS — I25.10: ICD-10-CM

## 2021-05-10 DIAGNOSIS — Z79.899: ICD-10-CM

## 2021-05-10 DIAGNOSIS — Z90.49: ICD-10-CM

## 2021-05-10 DIAGNOSIS — Z93.3: ICD-10-CM

## 2021-05-10 DIAGNOSIS — E78.5: ICD-10-CM

## 2021-05-10 DIAGNOSIS — I48.20: ICD-10-CM

## 2021-05-10 DIAGNOSIS — Z80.51: ICD-10-CM

## 2021-05-10 DIAGNOSIS — Z74.01: ICD-10-CM

## 2021-05-10 DIAGNOSIS — I08.1: ICD-10-CM

## 2021-05-10 DIAGNOSIS — J90: ICD-10-CM

## 2021-05-10 DIAGNOSIS — Z95.0: ICD-10-CM

## 2021-05-10 DIAGNOSIS — Z20.822: ICD-10-CM

## 2021-05-10 DIAGNOSIS — R64: ICD-10-CM

## 2021-05-10 DIAGNOSIS — E61.1: ICD-10-CM

## 2021-05-10 DIAGNOSIS — Z79.01: ICD-10-CM

## 2021-05-10 DIAGNOSIS — F32.9: ICD-10-CM

## 2021-05-10 DIAGNOSIS — I73.00: ICD-10-CM

## 2021-05-10 DIAGNOSIS — R65.21: ICD-10-CM

## 2021-05-10 DIAGNOSIS — I46.8: ICD-10-CM

## 2021-05-10 DIAGNOSIS — I25.2: ICD-10-CM

## 2021-05-10 DIAGNOSIS — Z90.710: ICD-10-CM

## 2021-05-10 DIAGNOSIS — I13.11: ICD-10-CM

## 2021-05-10 DIAGNOSIS — N39.0: ICD-10-CM

## 2021-05-10 DIAGNOSIS — Z79.890: ICD-10-CM

## 2021-05-10 DIAGNOSIS — I44.7: ICD-10-CM

## 2021-05-10 DIAGNOSIS — A41.01: Primary | ICD-10-CM

## 2021-05-10 DIAGNOSIS — I44.0: ICD-10-CM

## 2021-05-10 DIAGNOSIS — N17.0: ICD-10-CM

## 2021-05-10 DIAGNOSIS — E43: ICD-10-CM

## 2021-05-10 DIAGNOSIS — N18.6: ICD-10-CM

## 2021-05-10 DIAGNOSIS — J69.0: ICD-10-CM

## 2021-05-10 DIAGNOSIS — E03.9: ICD-10-CM

## 2021-05-10 DIAGNOSIS — Z80.1: ICD-10-CM

## 2021-05-10 DIAGNOSIS — Z79.82: ICD-10-CM

## 2021-05-10 DIAGNOSIS — E87.6: ICD-10-CM

## 2021-05-10 DIAGNOSIS — Z99.2: ICD-10-CM

## 2021-05-10 DIAGNOSIS — Z80.8: ICD-10-CM

## 2021-05-10 DIAGNOSIS — Z99.81: ICD-10-CM

## 2021-05-10 DIAGNOSIS — Z83.3: ICD-10-CM

## 2021-05-10 DIAGNOSIS — L89.153: ICD-10-CM

## 2021-05-10 DIAGNOSIS — F41.9: ICD-10-CM

## 2021-05-10 DIAGNOSIS — I95.1: ICD-10-CM

## 2021-05-10 DIAGNOSIS — D63.1: ICD-10-CM

## 2021-05-10 DIAGNOSIS — Z51.5: ICD-10-CM

## 2021-05-10 DIAGNOSIS — I25.5: ICD-10-CM

## 2021-05-10 DIAGNOSIS — Z79.02: ICD-10-CM

## 2021-05-10 DIAGNOSIS — Z85.01: ICD-10-CM

## 2021-05-10 DIAGNOSIS — G25.0: ICD-10-CM

## 2021-05-10 DIAGNOSIS — I48.0: ICD-10-CM

## 2021-05-10 DIAGNOSIS — E87.2: ICD-10-CM

## 2021-05-10 DIAGNOSIS — I27.20: ICD-10-CM

## 2021-05-10 DIAGNOSIS — Z95.5: ICD-10-CM

## 2021-05-10 DIAGNOSIS — Z66: ICD-10-CM

## 2021-05-10 LAB
ALBUMIN SERPL-MCNC: 3.2 G/DL (ref 3.5–5)
ALP SERPL-CCNC: 389 U/L (ref 38–126)
ALT SERPL-CCNC: 136 U/L (ref 4–34)
ANION GAP SERPL CALC-SCNC: 18 MMOL/L
APTT BLD: >200 SEC (ref 22–30)
AST SERPL-CCNC: 475 U/L (ref 14–36)
BASOPHILS # BLD AUTO: 0 K/UL (ref 0–0.2)
BASOPHILS NFR BLD AUTO: 0 %
BUN SERPL-SCNC: 47 MG/DL (ref 7–17)
CALCIUM SPEC-MCNC: 8.6 MG/DL (ref 8.4–10.2)
CHLORIDE SERPL-SCNC: 101 MMOL/L (ref 98–107)
CO2 SERPL-SCNC: 18 MMOL/L (ref 22–30)
EOSINOPHIL # BLD AUTO: 0 K/UL (ref 0–0.7)
EOSINOPHIL NFR BLD AUTO: 0 %
ERYTHROCYTE [DISTWIDTH] IN BLOOD BY AUTOMATED COUNT: 3.55 M/UL (ref 3.8–5.4)
ERYTHROCYTE [DISTWIDTH] IN BLOOD: 16.3 % (ref 11.5–15.5)
GLUCOSE SERPL-MCNC: 93 MG/DL (ref 74–99)
HCO3 BLDV-SCNC: 19 MMOL/L (ref 24–28)
HCT VFR BLD AUTO: 39.2 % (ref 34–46)
HGB BLD-MCNC: 12 GM/DL (ref 11.4–16)
INR PPP: 1.3 (ref ?–1.2)
LYMPHOCYTES # SPEC AUTO: 0.5 K/UL (ref 1–4.8)
LYMPHOCYTES NFR SPEC AUTO: 3 %
MAGNESIUM SPEC-SCNC: 2.3 MG/DL (ref 1.6–2.3)
MCH RBC QN AUTO: 33.9 PG (ref 25–35)
MCHC RBC AUTO-ENTMCNC: 30.7 G/DL (ref 31–37)
MCV RBC AUTO: 110.3 FL (ref 80–100)
MONOCYTES # BLD AUTO: 0.3 K/UL (ref 0–1)
MONOCYTES NFR BLD AUTO: 2 %
NEUTROPHILS # BLD AUTO: 14.2 K/UL (ref 1.3–7.7)
NEUTROPHILS NFR BLD AUTO: 94 %
PCO2 BLDV: 42 MMHG (ref 37–51)
PH BLDV: 7.28 [PH] (ref 7.31–7.41)
PLATELET # BLD AUTO: 205 K/UL (ref 150–450)
POTASSIUM SERPL-SCNC: 3.6 MMOL/L (ref 3.5–5.1)
PROT SERPL-MCNC: 6.5 G/DL (ref 6.3–8.2)
PT BLD: 13.2 SEC (ref 9–12)
SODIUM SERPL-SCNC: 137 MMOL/L (ref 137–145)
WBC # BLD AUTO: 15.1 K/UL (ref 3.8–10.6)

## 2021-05-10 PROCEDURE — 80053 COMPREHEN METABOLIC PANEL: CPT

## 2021-05-10 PROCEDURE — 85610 PROTHROMBIN TIME: CPT

## 2021-05-10 PROCEDURE — 3E043XZ INTRODUCTION OF VASOPRESSOR INTO CENTRAL VEIN, PERCUTANEOUS APPROACH: ICD-10-PCS

## 2021-05-10 PROCEDURE — 83605 ASSAY OF LACTIC ACID: CPT

## 2021-05-10 PROCEDURE — 80202 ASSAY OF VANCOMYCIN: CPT

## 2021-05-10 PROCEDURE — 71045 X-RAY EXAM CHEST 1 VIEW: CPT

## 2021-05-10 PROCEDURE — 84484 ASSAY OF TROPONIN QUANT: CPT

## 2021-05-10 PROCEDURE — 87340 HEPATITIS B SURFACE AG IA: CPT

## 2021-05-10 PROCEDURE — 83540 ASSAY OF IRON: CPT

## 2021-05-10 PROCEDURE — 87186 SC STD MICRODIL/AGAR DIL: CPT

## 2021-05-10 PROCEDURE — 84443 ASSAY THYROID STIM HORMONE: CPT

## 2021-05-10 PROCEDURE — 93005 ELECTROCARDIOGRAM TRACING: CPT

## 2021-05-10 PROCEDURE — 99291 CRITICAL CARE FIRST HOUR: CPT

## 2021-05-10 PROCEDURE — 84100 ASSAY OF PHOSPHORUS: CPT

## 2021-05-10 PROCEDURE — 85025 COMPLETE CBC W/AUTO DIFF WBC: CPT

## 2021-05-10 PROCEDURE — 06HY33Z INSERTION OF INFUSION DEVICE INTO LOWER VEIN, PERCUTANEOUS APPROACH: ICD-10-PCS

## 2021-05-10 PROCEDURE — 87077 CULTURE AEROBIC IDENTIFY: CPT

## 2021-05-10 PROCEDURE — 90935 HEMODIALYSIS ONE EVALUATION: CPT

## 2021-05-10 PROCEDURE — 83550 IRON BINDING TEST: CPT

## 2021-05-10 PROCEDURE — 80048 BASIC METABOLIC PNL TOTAL CA: CPT

## 2021-05-10 PROCEDURE — 87635 SARS-COV-2 COVID-19 AMP PRB: CPT

## 2021-05-10 PROCEDURE — 82728 ASSAY OF FERRITIN: CPT

## 2021-05-10 PROCEDURE — 83735 ASSAY OF MAGNESIUM: CPT

## 2021-05-10 PROCEDURE — 87075 CULTR BACTERIA EXCEPT BLOOD: CPT

## 2021-05-10 PROCEDURE — 82803 BLOOD GASES ANY COMBINATION: CPT

## 2021-05-10 PROCEDURE — 87040 BLOOD CULTURE FOR BACTERIA: CPT

## 2021-05-10 PROCEDURE — 36415 COLL VENOUS BLD VENIPUNCTURE: CPT

## 2021-05-10 PROCEDURE — 87205 SMEAR GRAM STAIN: CPT

## 2021-05-10 PROCEDURE — 82330 ASSAY OF CALCIUM: CPT

## 2021-05-10 PROCEDURE — 81001 URINALYSIS AUTO W/SCOPE: CPT

## 2021-05-10 PROCEDURE — 85730 THROMBOPLASTIN TIME PARTIAL: CPT

## 2021-05-10 PROCEDURE — 86706 HEP B SURFACE ANTIBODY: CPT

## 2021-05-10 PROCEDURE — 87070 CULTURE OTHR SPECIMN AEROBIC: CPT

## 2021-05-10 PROCEDURE — 94760 N-INVAS EAR/PLS OXIMETRY 1: CPT

## 2021-05-10 PROCEDURE — 87086 URINE CULTURE/COLONY COUNT: CPT

## 2021-05-10 RX ADMIN — CEFAZOLIN SCH MLS/HR: 330 INJECTION, POWDER, FOR SOLUTION INTRAMUSCULAR; INTRAVENOUS at 20:28

## 2021-05-10 NOTE — XR
EXAMINATION TYPE: XR chest 1V portable

 

DATE OF EXAM: 5/10/2021

 

COMPARISON: NONE

 

HISTORY: Syncope

 

TECHNIQUE: Single view

 

FINDINGS: There is right axillary pacemaker. There is coarse predominantly interstitial infiltrate in
 the right lung. There is elevated right diaphragm. Left lung is fairly clear. There is bilateral natalio
ulder prosthesis. There are chest leads. Thoracic aorta is atheromatous.

 

IMPRESSION: Coarse interstitial infiltrate in the right lung. No obvious heart failure. There is mild
 volume loss in the right lung that could relate to surgery.

## 2021-05-10 NOTE — ED
General Adult HPI





- General


Stated complaint: Unresponsive





- History of Present Illness


Initial comments: 


Dictation was produced using dragon dictation software. please excuse any 

grammatical, word or spelling errors. 





This patient was cared for during a federal and state declared state of 

emergency secondary to Covid 19





Chief Complaint: 80-year-old female brought to the emergency department for 

episode of cyanosis and cardiac arrest





History of Present Illness: 80-year-old female she is currently a resident at 

[facility].  Patient has multiple comorbidities.  She has history of A. fib, 

coronary artery disease, myocardial infarction, kidney failure.  She was at 

dialysis when around the time of the initiation of her dialysis treatment she 

became unresponsive and cyanotic.  Patient is brought in by EMS.  Patient does 

not recall any of the events.  EMS reports when they got there she was very 

cyanotic and unresponsive.  Dialysis staff perform some CPR.  It was unclear if 

patient had a pulse or not when CPR was started.  En route to the emergency 

department her symptoms improved.  Patient states she does not have any symptoms

except for tailbone pain.  EMS performed an EKG which they're EKG machine 

interpreted acute ST segment elevation MI.  Patient does not have any chest pain

or shortness of breath.








The ROS documented in this emergency department record has been reviewed and 

confirmed by me.  Those systems with pertinent positive or negative responses 

have been documented in the HPI.  All other systems are other negative and/or 

noncontributory.








PHYSICAL EXAM:


General Impression: Alert and oriented x3, cachectic


HEENT: Normocephalic atraumatic, extra-ocular movements intact, pupils equal and

reactive to light bilaterally, dry mucous membranes


Cardiovascular: Heart regular rate and rhythm


Chest: Able to complete sentences, no retractions, no tachypnea


Abdomen: abdomen soft, non-tender, non-distended, no organomegaly


Musculoskeletal: Pulses present and equal in all extremities, no peripheral 

edema


Neurological: CN II-XII grossly intact, no focal motor or sensory deficits noted


Skin: Malodorous decubitus ulcer with mild surrounding erythema








ED course: 80-year-old who presents after suspicion of episode of cardiac 

arrest. Vital signs upon arrival shows several 97.5, heart rate of 76, 

respiratory rate 18, blood pressure 7458, 99% on 4 L nasal cannula..  She is 

hypotensive there is concern of sepsis given foul-smelling malodorous decubitus 

ulcer concerning to be the site of infection.  Patient is 44 kg per she is given

30 mL per KG bolus.  Patient was hypotensive.  CODE STATUS was discussed with 

patient she wants full resuscitative measures if she expresses cardiac arrest or

respiratory arrest.  Patient had right groin central venous catheter placed 

under ultrasound guidance.  Patient started on levo fed and titrated for mat.  

Laboratory evaluation obtained.  Leukocytosis 15.1.  VBG shows pH of 7.28 with a

bicarb of 19 concerning for metabolic acidosis.  Metabolic panel shows bicarb of

18 with a gap of 18.  Creatinine is 3.5 through the BUN 47.  Lactic acidosis 

4.7.  Elevated liver enzymes.  Troponin 0.050.  Patient reevaluated at bedside 

at 7:30 PM.  She states she is feeling well except for some tailbone pain.  She 

denies any chest pain or shortness of breath.  Patient is not hypoxic.  At this 

point there is concern of septic shock secondary to infected decubitus ulcer.  

Patient started on route spectrum antibiotics.  She will be admitted to the ICU 

for hypertension and metabolic acidosis.  Infectious disease, nephrology, 

cardiology consulted.  Case discussed with Dr. Bill will be the hospitalist jie moore case discussed Dr. Doss intensivist was 1 except patient to the ICU.





EKG interpretation: Ventricular rate 83, normal sinus rhythm, GA interval 206, 

, QTc 549.  There is an EKG from 02/09/2020 showing no widened QRS, 

hyperacute T waves.











- Related Data


                                Home Medications











 Medication  Instructions  Recorded  Confirmed


 


Ferrous Sulfate [Iron (65  mg PO BID 10/04/16 02/08/20





Elemental)]   


 


Levothyroxine Sodium [Synthroid] 25 mcg PO DAILY 10/04/16 02/08/20


 


Magnesium Gluconate [Magonate] 500 mg PO BID 10/04/16 02/08/20


 


Multivitamins, Thera [Multivitamin 1 tab PO DAILY 10/04/16 02/08/20





(formulary)]   


 


Vitamin B Complex 1 cap PO DAILY 10/04/16 02/08/20


 


Apixaban [Eliquis] 2.5 mg PO BID 02/08/20 02/08/20


 


Calcium Carbonate [Calcium] 1,200 mg PO BID 02/08/20 02/08/20


 


Cholecalciferol [Vitamin D3 (25 2,000 unit PO BID 02/08/20 02/08/20





Mcg = 1000 Iu)]   








                                  Previous Rx's











 Medication  Instructions  Recorded


 


Aspirin EC [Ecotrin Low Dose] 81 mg PO DAILY #30 tablet. 02/11/20


 


Atorvastatin [Lipitor] 80 mg PO HS  tab 02/11/20


 


Clopidogrel [Plavix] 75 mg PO DAILY  tab 02/11/20


 


Furosemide [Lasix] 20 mg PO DAILY  tab 02/11/20


 


Gabapentin [Neurontin] 300 mg PO BID@0800,1200 #6 cap 02/11/20


 


Gabapentin [Neurontin] 600 mg PO HS #3 cap 02/11/20


 


Loratadine [Claritin] 10 mg PO DAILY  tab 02/11/20


 


Metoprolol Tartrate [Lopressor] 25 mg PO BID  tab 02/11/20


 


Nitroglycerin Sl Tabs [Nitrostat] 0.4 mg SUBLINGUAL Q5M PRN  tab 02/11/20


 


lisinopriL [Zestril] 2.5 mg PO DAILY  tab 02/11/20


 


oxyCODONE HCL [oxyCODONE HCL (IR)] 10 mg PO Q4H PRN #18 tab 02/11/20











                                    Allergies











Allergy/AdvReac Type Severity Reaction Status Date / Time


 


ciprofloxacin [From Cipro] Allergy  Nausea & Verified 05/10/21 18:59





   Vomiting  


 


Penicillins Allergy  Anaphylaxis Verified 05/10/21 18:59


 


venom-honey bee Allergy  Anaphylaxis Verified 05/10/21 18:59





[bee venom (honey bee)]     


 


venom-wasp [wasp venom] Allergy  Anaphylaxis Verified 05/10/21 18:59














Review of Systems


ROS Statement: 


Those systems with pertinent positive or pertinent negative responses have been 

documented in the HPI.





ROS Other: All systems not noted in ROS Statement are negative.





Past Medical History


Past Medical History: Atrial Fibrillation, Coronary Artery Disease (CAD), 

Cancer, Chest Pain / Angina, Hypertension, Myocardial Infarction (MI), 

Osteoarthritis (OA), Thyroid Disorder


Additional Past Medical History / Comment(s): " LEAKY VALVES", ESOPHAGEAL 

CANCER,  DECREASED KIDNEY FUNCTION, CKD, ischemic bowel, Raynauds, Cardiac cath 

with stents.


Last Myocardial Infarction Date:: 2/8/20


History of Any Multi-Drug Resistant Organisms: None Reported


Past Surgical History: Appendectomy, Cholecystectomy, Heart Catheterization, 

Hysterectomy


Additional Past Surgical History / Comment(s): ESOPHAGUS SURGERY, FOOT SURGERY, 

LEFT KNEE SURGERY, PARATHYROID SURGERY, BILATERAL WRIST, colectory with ostomy 

placement.


Past Anesthesia/Blood Transfusion Reactions: No Reported Reaction


Past Psychological History: No Psychological Hx Reported


Past Alcohol Use History: Rare


Additional Past Alcohol Use History / Comment(s): STARTED SMOKING AT AGE 13 QUIT

SMOKING AT AGE 53 SMOKED 2-3PPD


Past Drug Use History: None Reported





- Past Family History


  ** Mother


Family Medical History: Cancer (Kidney)





  ** Father


Family Medical History: Cancer (Mouth cancer at 84)





  ** Daughter(s)


Family Medical History: Cancer (Lung cancer)





  ** Brother(s)


Family Medical History: No Reported History





  ** Sister(s)


Family Medical History: No Reported History





Course


                                   Vital Signs











  05/10/21 05/10/21 05/10/21





  18:56 19:05 19:27


 


Temperature 97.5 F L  


 


Pulse Rate 76 72 67


 


Respiratory 18 18 16





Rate   


 


Blood Pressure 74/58 65/27 116/75


 


O2 Sat by Pulse 99 100 100





Oximetry   














  05/10/21





  19:54


 


Temperature 


 


Pulse Rate 


 


Respiratory 





Rate 


 


Blood Pressure 72/46


 


O2 Sat by Pulse 





Oximetry 














Procedures





- Central Line Placement


  ** Right Femoral


Consent Obtained: verbal consent, written consent


Patient Placed on Monitor/Pulse Ox: Yes


MD Prep: mask


Central Line Prep: Povidone-Iodine 1%


Local Anesthesia Used: Lidocaine 1%


Ultrasound Used for Placement: Yes


Central Line Lumen Inserted: triple


Bloods Obtained for Lab: Yes


Central Line Position: good blood return, all ports aspirated, flushed, capped, 

sutured in place with 3-0 nylon


Dressing Applied: Tegaderm


Patient Tolerated Procedure: well





Medical Decision Making





- Lab Data


Result diagrams: 


                                 05/10/21 18:46





                                 05/10/21 18:46


                                   Lab Results











  05/10/21 05/10/21 05/10/21 Range/Units





  18:46 18:46 18:46 


 


WBC  15.1 H    (3.8-10.6)  k/uL


 


RBC  3.55 L    (3.80-5.40)  m/uL


 


Hgb  12.0  D    (11.4-16.0)  gm/dL


 


Hct  39.2    (34.0-46.0)  %


 


MCV  110.3 H    (80.0-100.0)  fL


 


MCH  33.9    (25.0-35.0)  pg


 


MCHC  30.7 L    (31.0-37.0)  g/dL


 


RDW  16.3 H    (11.5-15.5)  %


 


Plt Count  205  D    (150-450)  k/uL


 


MPV  7.8    


 


Hypochromasia  Marked    


 


Poikilocytosis  Slight    


 


Anisocytosis  Slight    


 


Macrocytosis  Marked A    


 


VBG pH     (7.31-7.41)  


 


VBG pCO2     (37-51)  mmHg


 


VBG HCO3     (24-28)  mmol/L


 


Sodium   137   (137-145)  mmol/L


 


Potassium   3.6   (3.5-5.1)  mmol/L


 


Chloride   101   ()  mmol/L


 


Carbon Dioxide   18 L   (22-30)  mmol/L


 


Anion Gap   18   mmol/L


 


BUN   47 H   (7-17)  mg/dL


 


Creatinine   3.53 H   (0.52-1.04)  mg/dL


 


Est GFR (CKD-EPI)AfAm   13   (>60 ml/min/1.73 sqM)  


 


Est GFR (CKD-EPI)NonAf   11   (>60 ml/min/1.73 sqM)  


 


Glucose   93   (74-99)  mg/dL


 


Plasma Lactic Acid Adair    4.7 H*  (0.7-2.0)  mmol/L


 


Calcium   8.6   (8.4-10.2)  mg/dL


 


Ionized Calcium Ariane   4.3 L   (4.5-5.3)  mg/dL


 


Magnesium   2.3   (1.6-2.3)  mg/dL


 


Total Bilirubin   1.7 H   (0.2-1.3)  mg/dL


 


AST   475 H   (14-36)  U/L


 


ALT   136 H   (4-34)  U/L


 


Alkaline Phosphatase   389 H   ()  U/L


 


Troponin I     (0.000-0.034)  ng/mL


 


Total Protein   6.5   (6.3-8.2)  g/dL


 


Albumin   3.2 L   (3.5-5.0)  g/dL


 


TSH   3.360   (0.465-4.680)  mIU/L














  05/10/21 05/10/21 Range/Units





  18:46 18:46 


 


WBC    (3.8-10.6)  k/uL


 


RBC    (3.80-5.40)  m/uL


 


Hgb    (11.4-16.0)  gm/dL


 


Hct    (34.0-46.0)  %


 


MCV    (80.0-100.0)  fL


 


MCH    (25.0-35.0)  pg


 


MCHC    (31.0-37.0)  g/dL


 


RDW    (11.5-15.5)  %


 


Plt Count    (150-450)  k/uL


 


MPV    


 


Hypochromasia    


 


Poikilocytosis    


 


Anisocytosis    


 


Macrocytosis    


 


VBG pH   7.28 L  (7.31-7.41)  


 


VBG pCO2   42  (37-51)  mmHg


 


VBG HCO3   19 L  (24-28)  mmol/L


 


Sodium    (137-145)  mmol/L


 


Potassium    (3.5-5.1)  mmol/L


 


Chloride    ()  mmol/L


 


Carbon Dioxide    (22-30)  mmol/L


 


Anion Gap    mmol/L


 


BUN    (7-17)  mg/dL


 


Creatinine    (0.52-1.04)  mg/dL


 


Est GFR (CKD-EPI)AfAm    (>60 ml/min/1.73 sqM)  


 


Est GFR (CKD-EPI)NonAf    (>60 ml/min/1.73 sqM)  


 


Glucose    (74-99)  mg/dL


 


Plasma Lactic Acid Adair    (0.7-2.0)  mmol/L


 


Calcium    (8.4-10.2)  mg/dL


 


Ionized Calcium Ariane    (4.5-5.3)  mg/dL


 


Magnesium    (1.6-2.3)  mg/dL


 


Total Bilirubin    (0.2-1.3)  mg/dL


 


AST    (14-36)  U/L


 


ALT    (4-34)  U/L


 


Alkaline Phosphatase    ()  U/L


 


Troponin I  0.050 H*   (0.000-0.034)  ng/mL


 


Total Protein    (6.3-8.2)  g/dL


 


Albumin    (3.5-5.0)  g/dL


 


TSH    (0.465-4.680)  mIU/L














Critical Care Time


Critical Care Time: Yes


Total Critical Care Time: 33





Disposition


Clinical Impression: 


 Cardiac arrest, Septic shock





Disposition: ADMITTED AS IP TO THIS [facility]


Condition: Critical


Referrals: 


Myrna Bill MD [Primary Care Provider] - 1-2 days


Decision Time: 19:56

## 2021-05-11 LAB
ALBUMIN SERPL-MCNC: 2.6 G/DL (ref 3.5–5)
ALP SERPL-CCNC: 298 U/L (ref 38–126)
ALT SERPL-CCNC: 152 U/L (ref 4–34)
ANION GAP SERPL CALC-SCNC: 13 MMOL/L
AST SERPL-CCNC: 307 U/L (ref 14–36)
BASOPHILS # BLD AUTO: 0 K/UL (ref 0–0.2)
BASOPHILS NFR BLD AUTO: 0 %
BUN SERPL-SCNC: 52 MG/DL (ref 7–17)
CALCIUM SPEC-MCNC: 7.8 MG/DL (ref 8.4–10.2)
CHLORIDE SERPL-SCNC: 108 MMOL/L (ref 98–107)
CO2 SERPL-SCNC: 18 MMOL/L (ref 22–30)
EOSINOPHIL # BLD AUTO: 0 K/UL (ref 0–0.7)
EOSINOPHIL NFR BLD AUTO: 0 %
ERYTHROCYTE [DISTWIDTH] IN BLOOD BY AUTOMATED COUNT: 3.41 M/UL (ref 3.8–5.4)
ERYTHROCYTE [DISTWIDTH] IN BLOOD: 16.2 % (ref 11.5–15.5)
GLUCOSE SERPL-MCNC: 91 MG/DL (ref 74–99)
HCT VFR BLD AUTO: 38.7 % (ref 34–46)
HGB BLD-MCNC: 11.5 GM/DL (ref 11.4–16)
HYALINE CASTS UR QL AUTO: 251 /LPF (ref 0–2)
LYMPHOCYTES # SPEC AUTO: 0.6 K/UL (ref 1–4.8)
LYMPHOCYTES NFR SPEC AUTO: 4 %
MCH RBC QN AUTO: 33.6 PG (ref 25–35)
MCHC RBC AUTO-ENTMCNC: 29.6 G/DL (ref 31–37)
MCV RBC AUTO: 113.4 FL (ref 80–100)
MONOCYTES # BLD AUTO: 0.5 K/UL (ref 0–1)
MONOCYTES NFR BLD AUTO: 4 %
NEUTROPHILS # BLD AUTO: 12.1 K/UL (ref 1.3–7.7)
NEUTROPHILS NFR BLD AUTO: 90 %
PH UR: 5 [PH] (ref 5–8)
PLATELET # BLD AUTO: 184 K/UL (ref 150–450)
POTASSIUM SERPL-SCNC: 4.8 MMOL/L (ref 3.5–5.1)
PROT SERPL-MCNC: 5.4 G/DL (ref 6.3–8.2)
PROT UR QL: (no result)
RBC UR QL: >182 /HPF (ref 0–5)
SODIUM SERPL-SCNC: 139 MMOL/L (ref 137–145)
SP GR UR: 1.02 (ref 1–1.03)
UROBILINOGEN UR QL STRIP: <2 MG/DL (ref ?–2)
WBC # BLD AUTO: 13.4 K/UL (ref 3.8–10.6)
WBC # UR AUTO: >182 /HPF (ref 0–5)

## 2021-05-11 RX ADMIN — CEFEPIME HYDROCHLORIDE SCH MLS/HR: 1 INJECTION, POWDER, FOR SOLUTION INTRAMUSCULAR; INTRAVENOUS at 22:01

## 2021-05-11 RX ADMIN — CEFAZOLIN SCH: 330 INJECTION, POWDER, FOR SOLUTION INTRAMUSCULAR; INTRAVENOUS at 23:12

## 2021-05-11 RX ADMIN — Medication SCH MG: at 22:17

## 2021-05-11 RX ADMIN — MORPHINE SULFATE PRN MG: 2 INJECTION, SOLUTION INTRAMUSCULAR; INTRAVENOUS at 22:17

## 2021-05-11 RX ADMIN — APIXABAN SCH MG: 2.5 TABLET, FILM COATED ORAL at 18:03

## 2021-05-11 RX ADMIN — CEFAZOLIN SCH: 330 INJECTION, POWDER, FOR SOLUTION INTRAMUSCULAR; INTRAVENOUS at 09:34

## 2021-05-11 RX ADMIN — CEFAZOLIN SCH MLS/HR: 330 INJECTION, POWDER, FOR SOLUTION INTRAMUSCULAR; INTRAVENOUS at 09:35

## 2021-05-11 RX ADMIN — ATORVASTATIN CALCIUM SCH MG: 80 TABLET, FILM COATED ORAL at 22:06

## 2021-05-11 NOTE — XR
EXAMINATION TYPE: XR chest 1V portable

 

DATE OF EXAM: 5/11/2021

 

COMPARISON: 5/10/2021

 

HISTORY: Shortness of breath

 

TECHNIQUE: Single frontal view of the chest is obtained.

 

FINDINGS:  Bilateral infiltrate and pleural effusion greater on the right stable. Cardiac device note
d. Bilateral shoulder prostheses. No pneumothorax. Heart size stable.

 

IMPRESSION:  

1. Bilateral infiltrate and pleural effusion greater on the right stable correlate for pneumonia vers
us asymmetric pulmonary edema.

## 2021-05-11 NOTE — P.CONS
History of Present Illness





- Reason for Consult


Consult date: 05/11/21


Sepsis


Requesting physician: Myrna Bill





- Chief Complaint


unresponsive x 1 day





- History of Present Illness


Patient is a 80-year-old  female with a past medical history 

significant for end-stage renal disease on hemodialysis through left groin, 

catheter Wednesday Friday patient was in the hemodialysis unit yesterday and 

become unresponsive and become cyanotic and CPR was started by the hemodialysis 

staff EMS was called and and the patient was brought into the ER patient was 

hypotensive and requiring pressor support and IV fluids, patient is afebrile, 

the patient did have white count of 15,000 with a left shift patient did have a 

positive UA chest x-ray with some interstitial prominence on the right side, 

patient is awake and alert throughout my evaluation she knows she is in the 

hospital denies having any headache chest pain shortness of the cough no a

bdominal pain no diarrhea, the patient did have a second pressure ulcer which is

getting worse and the patient complains of pain to the sacral area more of a 

dull aching to sharp 5-6 attenuation most when she has not donated, patient did 

have a dialysis catheter in the left groin patient has been started on cefepime 

and vancomycin infectious disease was consulted for further management of 

antibiotic therapy








Review of Systems


Positive point has been  mentioned in the HPI rest of the systems are negative








Past Medical History


Past Medical History: Atrial Fibrillation, Coronary Artery Disease (CAD), 

Cancer, Chest Pain / Angina, Hypertension, Myocardial Infarction (MI), 

Osteoarthritis (OA), Thyroid Disorder


Additional Past Medical History / Comment(s): " LEAKY VALVES", ESOPHAGEAL 

CANCER,  DECREASED KIDNEY FUNCTION, CKD, ischemic bowel, Raynauds, Cardiac cath 

with stents.


Last Myocardial Infarction Date:: 2/8/20


History of Any Multi-Drug Resistant Organisms: None Reported


Past Surgical History: Appendectomy, Cholecystectomy, Heart Catheterization, 

Hysterectomy


Additional Past Surgical History / Comment(s): ESOPHAGUS SURGERY, FOOT SURGERY, 

LEFT KNEE SURGERY, PARATHYROID SURGERY, BILATERAL WRIST, colectory with ostomy 

placement.


Past Anesthesia/Blood Transfusion Reactions: No Reported Reaction


Past Psychological History: No Psychological Hx Reported


Past Alcohol Use History: Rare


Additional Past Alcohol Use History / Comment(s): STARTED SMOKING AT AGE 13 QUIT

SMOKING AT AGE 53 SMOKED 2-3PPD


Past Drug Use History: None Reported





- Past Family History


  ** Mother


Family Medical History: Cancer (Kidney)





  ** Father


Family Medical History: Cancer (Mouth cancer at 84)





  ** Daughter(s)


Family Medical History: Cancer (Lung cancer)





  ** Brother(s)


Family Medical History: No Reported History





  ** Sister(s)


Family Medical History: No Reported History





Medications and Allergies


                                Home Medications











 Medication  Instructions  Recorded  Confirmed  Type


 


Apixaban [Eliquis] 2.5 mg PO BID@0800,1700 02/08/20 05/10/21 History


 


ALPRAZolam [Xanax] 0.25 mg PO BID PRN 05/10/21 05/10/21 History


 


Acetaminophen Tab [Tylenol] 1,000 mg PO TID PRN 05/10/21 05/10/21 History


 


Amiodarone HCl [Pacerone] 200 mg PO DAILY@0800 05/10/21 05/10/21 History


 


Atorvastatin [Lipitor] 80 mg PO HS@2100 05/10/21 05/10/21 History


 


Collagenase [Santyl] 1 applic TOPICAL DAILY 05/10/21 05/10/21 History


 


Ipratropium-Albuterol Nebulize 3 ml PO QID@08,12,17,21 05/10/21 05/10/21 History





[Duoneb 0.5 mg-3 mg/3 ml Soln]    


 


Liquacel (Unknown Strength) 30 ml PO BID@0800,1200 05/10/21 05/10/21 History


 


Melatonin 1 mg PO HS@2100 05/10/21 05/10/21 History


 


Metoclopramide HCl [Reglan] 5 mg PO TID PRN 05/10/21 05/10/21 History


 


Metoprolol Tartrate [Lopressor] 25 mg PO BID@0800,1700 05/10/21 05/10/21 History


 


Midodrine HCl [ProAmatine] 10 mg PO TID@0800,1200,1700 05/10/21 05/10/21 History


 


Mirtazapine 7.5 mg PO HS@2100 05/10/21 05/10/21 History


 


Nepro 1 can PO TID@0800,1200,2100 05/10/21 05/10/21 History


 


Pantoprazole [Protonix] 40 mg PO DAILY@0600 05/10/21 05/10/21 History


 


Sertraline [Zoloft] 25 mg PO DAILY@0800 05/10/21 05/10/21 History


 


bisacodyL [Dulcolax] 10 mg RECTAL DAILY PRN 05/10/21 05/10/21 History


 


oxyCODONE HCL 5 mg PO TID PRN 05/10/21 05/10/21 History








                                    Allergies











Allergy/AdvReac Type Severity Reaction Status Date / Time


 


ciprofloxacin [From Cipro] Allergy  Nausea & Verified 05/10/21 21:29





   Vomiting  


 


Penicillins Allergy  Anaphylaxis Verified 05/10/21 21:29


 


venom-honey bee Allergy  Anaphylaxis Verified 05/10/21 21:29





[bee venom (honey bee)]     


 


venom-wasp [wasp venom] Allergy  Anaphylaxis Verified 05/10/21 21:29














Physical Exam


Vitals: 


                                   Vital Signs











  Temp Pulse Resp BP Pulse Ox


 


 05/11/21 09:57   70  18  127/56  96


 


 05/11/21 09:36   69  18  107/47  96


 


 05/11/21 08:41   67  16  120/47  96


 


 05/11/21 07:26   67  18  105/43  96


 


 05/11/21 07:17   66  16  95/52  98


 


 05/11/21 06:32   66  16  102/45  95


 


 05/11/21 05:22     115/65 


 


 05/11/21 04:33  97.8 F    


 


 05/11/21 04:29   67  17  108/63  95


 


 05/11/21 02:31   70  16  115/40  95


 


 05/11/21 01:04   71  16  105/45  93 L


 


 05/10/21 22:10   71  16  114/44  98


 


 05/10/21 20:04   68  18  110/44  100


 


 05/10/21 19:54     72/46 


 


 05/10/21 19:27   67  16  116/75  100


 


 05/10/21 19:05   72  18  65/27  100


 


 05/10/21 18:56  97.5 F L  76  18  74/58  99








                                Intake and Output











 05/10/21 05/11/21 05/11/21





 22:59 06:59 14:59


 


Intake Total 0.880 9.98 20.023


 


Output Total   200


 


Balance 0.880 9.98 -179.977


 


Intake:   


 


  Intake, IV Titration 0.880 9.98 20.023





  Amount   


 


    Norepinephrine 32 mg In 0.880 9.98 20.023





    Sodium Chloride 0.9% 218   





    ml @ 0.05 MCG/KG/MIN 1.   





    052 mls/hr IV .Q24H ONE   





    Rx#:[id number]   


 


Output:   


 


  Urine   200


 


Other:   


 


  Weight 44.906 kg  











GENERAL DESCRIPTION: Elderly female lying in bed, no distress. No tachypnea or 

accessory muscle of respiration use.


HEENT: Shows Pallor , no scleral icterus. Oral mucous membrane is dry. No 

pharyngeal erythema or thrush


NECK: Trachea central, no thyromegaly.


LUNGS: Unlabored breathing.  Decreased breath sound the bases.


HEART: S1, S2, regular rate and rhythm. No loud murmur


ABDOMEN: Soft, no tenderness , guarding or rigidity, no organomegaly


EXTREMITIES: No edema of feet.


SKIN: No rash, no masses palpable.


Stage III sacral pressure ulcer with slough tissue no significant surrounding 

swelling redness or drainage


NEUROLOGICAL: The patient is awake, alert, oriented x3, mood and affect normal.

















Results


CBC & Chem 7: 


                                 05/13/21 06:34





                                 05/13/21 06:34


Labs: 


                  Abnormal Lab Results - Last 24 Hours (Table)











  05/10/21 05/10/21 05/10/21 Range/Units





  18:46 18:46 18:46 


 


WBC  15.1 H    (3.8-10.6)  k/uL


 


RBC  3.55 L    (3.80-5.40)  m/uL


 


MCV  110.3 H    (80.0-100.0)  fL


 


MCHC  30.7 L    (31.0-37.0)  g/dL


 


RDW  16.3 H    (11.5-15.5)  %


 


Neutrophils #  14.2 H    (1.3-7.7)  k/uL


 


Lymphocytes #  0.5 L    (1.0-4.8)  k/uL


 


Macrocytosis  Marked A    


 


PT    13.2 H  (9.0-12.0)  sec


 


INR    1.3 H  (<1.2)  


 


APTT    >200.0 H*  (22.0-30.0)  sec


 


VBG pH     (7.31-7.41)  


 


VBG HCO3     (24-28)  mmol/L


 


Chloride     ()  mmol/L


 


Carbon Dioxide   18 L   (22-30)  mmol/L


 


BUN   47 H   (7-17)  mg/dL


 


Creatinine   3.53 H   (0.52-1.04)  mg/dL


 


Plasma Lactic Acid Adair     (0.7-2.0)  mmol/L


 


Calcium     (8.4-10.2)  mg/dL


 


Ionized Calcium Ariane   4.3 L   (4.5-5.3)  mg/dL


 


Total Bilirubin   1.7 H   (0.2-1.3)  mg/dL


 


AST   475 H   (14-36)  U/L


 


ALT   136 H   (4-34)  U/L


 


Alkaline Phosphatase   389 H   ()  U/L


 


Troponin I     (0.000-0.034)  ng/mL


 


Total Protein     (6.3-8.2)  g/dL


 


Albumin   3.2 L   (3.5-5.0)  g/dL














  05/10/21 05/10/21 05/10/21 Range/Units





  18:46 18:46 18:46 


 


WBC     (3.8-10.6)  k/uL


 


RBC     (3.80-5.40)  m/uL


 


MCV     (80.0-100.0)  fL


 


MCHC     (31.0-37.0)  g/dL


 


RDW     (11.5-15.5)  %


 


Neutrophils #     (1.3-7.7)  k/uL


 


Lymphocytes #     (1.0-4.8)  k/uL


 


Macrocytosis     


 


PT     (9.0-12.0)  sec


 


INR     (<1.2)  


 


APTT     (22.0-30.0)  sec


 


VBG pH    7.28 L  (7.31-7.41)  


 


VBG HCO3    19 L  (24-28)  mmol/L


 


Chloride     ()  mmol/L


 


Carbon Dioxide     (22-30)  mmol/L


 


BUN     (7-17)  mg/dL


 


Creatinine     (0.52-1.04)  mg/dL


 


Plasma Lactic Acid Adair  4.7 H*    (0.7-2.0)  mmol/L


 


Calcium     (8.4-10.2)  mg/dL


 


Ionized Calcium Ariane     (4.5-5.3)  mg/dL


 


Total Bilirubin     (0.2-1.3)  mg/dL


 


AST     (14-36)  U/L


 


ALT     (4-34)  U/L


 


Alkaline Phosphatase     ()  U/L


 


Troponin I   0.050 H*   (0.000-0.034)  ng/mL


 


Total Protein     (6.3-8.2)  g/dL


 


Albumin     (3.5-5.0)  g/dL














  05/10/21 05/11/21 05/11/21 Range/Units





  21:37 02:06 05:47 


 


WBC     (3.8-10.6)  k/uL


 


RBC     (3.80-5.40)  m/uL


 


MCV     (80.0-100.0)  fL


 


MCHC     (31.0-37.0)  g/dL


 


RDW     (11.5-15.5)  %


 


Neutrophils #     (1.3-7.7)  k/uL


 


Lymphocytes #     (1.0-4.8)  k/uL


 


Macrocytosis     


 


PT     (9.0-12.0)  sec


 


INR     (<1.2)  


 


APTT     (22.0-30.0)  sec


 


VBG pH     (7.31-7.41)  


 


VBG HCO3     (24-28)  mmol/L


 


Chloride     ()  mmol/L


 


Carbon Dioxide     (22-30)  mmol/L


 


BUN     (7-17)  mg/dL


 


Creatinine     (0.52-1.04)  mg/dL


 


Plasma Lactic Acid Adair  2.1 H*    (0.7-2.0)  mmol/L


 


Calcium     (8.4-10.2)  mg/dL


 


Ionized Calcium Ariane     (4.5-5.3)  mg/dL


 


Total Bilirubin     (0.2-1.3)  mg/dL


 


AST     (14-36)  U/L


 


ALT     (4-34)  U/L


 


Alkaline Phosphatase     ()  U/L


 


Troponin I   0.063 H*  0.066 H*  (0.000-0.034)  ng/mL


 


Total Protein     (6.3-8.2)  g/dL


 


Albumin     (3.5-5.0)  g/dL














  05/11/21 05/11/21 Range/Units





  08:13 08:13 


 


WBC   13.4 H  (3.8-10.6)  k/uL


 


RBC   3.41 L  (3.80-5.40)  m/uL


 


MCV   113.4 H  (80.0-100.0)  fL


 


MCHC   29.6 L  (31.0-37.0)  g/dL


 


RDW   16.2 H  (11.5-15.5)  %


 


Neutrophils #   12.1 H  (1.3-7.7)  k/uL


 


Lymphocytes #   0.6 L  (1.0-4.8)  k/uL


 


Macrocytosis   Marked A  


 


PT    (9.0-12.0)  sec


 


INR    (<1.2)  


 


APTT    (22.0-30.0)  sec


 


VBG pH    (7.31-7.41)  


 


VBG HCO3    (24-28)  mmol/L


 


Chloride  108 H   ()  mmol/L


 


Carbon Dioxide  18 L   (22-30)  mmol/L


 


BUN  52 H   (7-17)  mg/dL


 


Creatinine  3.76 H   (0.52-1.04)  mg/dL


 


Plasma Lactic Acid Adair    (0.7-2.0)  mmol/L


 


Calcium  7.8 L   (8.4-10.2)  mg/dL


 


Ionized Calcium Ariane    (4.5-5.3)  mg/dL


 


Total Bilirubin    (0.2-1.3)  mg/dL


 


AST  307 H   (14-36)  U/L


 


ALT  152 H   (4-34)  U/L


 


Alkaline Phosphatase  298 H   ()  U/L


 


Troponin I    (0.000-0.034)  ng/mL


 


Total Protein  5.4 L   (6.3-8.2)  g/dL


 


Albumin  2.6 L   (3.5-5.0)  g/dL








                      Microbiology - Last 24 Hours (Table)











 05/10/21 19:52 Gram Stain - Preliminary





 Other - Other Wound Culture - Preliminary


 


 05/10/21 19:52 Anaerobic Culture - Preliminary





 Other - Other 














Assessment and Plan


Assessment: 


-patient being admitted to hospital with episode of unresponsiveness requiring 

resuscitation she was noticed to be hypotensive requiring fluid support and did 

have elevated white count source could be dialysis catheter infection versus UTI

patient did have a stage III sacral pressure ulcer however her wound does not 

look infected, did have slough tissue but no significant swelling redness or any

foul-smelling drainage





(1) UTI (urinary tract infection)


Current Visit: Yes   Status: Acute   Code(s): N39.0 - URINARY TRACT INFECTION, 

SITE NOT SPECIFIED   SNOMED Code(s): 31460702


   





(2) Stage III pressure ulcer of sacral region


Current Visit: Yes   Status: Acute   Code(s): L89.153 - PRESSURE ULCER OF SACRAL

REGION, STAGE 3   SNOMED Code(s): 336870567


   


Plan: 


1-local wound care to the sacral wound with the Santyl followed by moist 

dressing and keep the area of the pressure


2-we will wait for the blood and urine culture to finalize


3-continue with vancomycin and cefepime


We will follow on clinical condition and cultures to further adjust medication 

if needed


Thank you for this consultation we will follow the patient along with you





Time with Patient: Greater than 30

## 2021-05-11 NOTE — P.CNPUL
History of Present Illness


Consult date: 05/11/21


Requesting physician: Myrna Bill


Reason for consult: other (Cardiac arrest)


Chief complaint: Cardiac arrest


History of present illness: 





This is an 80-year-old female, known history of end-stage renal disease, patient

is hemodialysis dependent.  Patient is normally on dialysis on Monday Wednesday 

and Friday schedule.  Patient was in the hemodialysis unit yesterday, and she 

became unresponsive.  This happened around the time of the initiation of 

dialysis treatment patient went unresponsive and went cyanotic.  CPR was started

by the hemodialysis unit staff, and EMS responded and found the patient cyanotic

and unresponsive.  In route to the emergency room, patient had return of 

spontaneous circulation, and she became responsive again, and she did not 

require intubation or mechanical ventilation.  However the patient was 

hypotensive requiring starting norepinephrine and she had a central line placed 

in her right groin by the ER physician.  Patient had no complaints except for 

pain in the tailbone area she had no shortness of breath, no cough, no wheezing,

no nausea, no vomiting, no abdominal pain patient was given fluid boluses in the

ER, and she was started empirically on antibiotics since she had 2 potential 

sources of sepsis including urine and sacral decubitus ulcer.  And leukocytosis 

with WBC count of 15.1.  She had a low bicarb of 18 with anion gap of 18 her BUN

was 47 creatinine 3.5 and lactic acid was 4.7.  Troponin was 0.05.  Patient 

remained in the ER overnight, and she was accepted to the unit but no beds 

available I evaluated the patient in the ER, she is on very minimal dose of 

norepinephrine which will likely be discontinued today.  And the patient remains

hemodynamically stable and she is only on few liters nasal cannula, then we can 

potentially continue present supportive care measures including antibiotics, 

hemodialysis, and potentially transfer the patient to a monitored bed on 

selective instead of ICU bed.  During my evaluation to the patient in the ER, 

she is awake, alert and oriented 3, and not in any distress.  Patient is known 

to have history of cardiomyopathy and LV dysfunction, her last ejection fraction

in February 2020 was 30-35%.  She is also known to have history of severe 

pulmonary hypertension and moderate to severe mitral regurgitation.  PCR for 

COVID-19 was negative.  Chest x-ray this morning showed bilateral infiltrates 

and pleural effusion, I'm strongly suspecting that the patient has unilateral 

pulmonary edema, underlying pneumonia especially aspiration pneumonia is not 

entirely ruled out





Review of Systems





Constitutional: Negative


HEENT: Negative


Pulmonary: Negative


Cardiac: As noted in HPI


GI: Negative, however the patient does have a chronic colostomy.


Genitourinary: Dark foul-smelling urine.


Musculoskeletal: Negative


Skin: Sacral decubitus ulcers


Hematologic: No clotting bleeding or bruising


Psychiatric: Negative


Urologic: Negative


Endocrine: Negative











Past Medical History


Past Medical History: Atrial Fibrillation, Coronary Artery Disease (CAD), 

Cancer, Chest Pain / Angina, Hypertension, Myocardial Infarction (MI), 

Osteoarthritis (OA), Thyroid Disorder


Additional Past Medical History / Comment(s): " LEAKY VALVES", ESOPHAGEAL 

CANCER,  DECREASED KIDNEY FUNCTION, CKD, ischemic bowel, Raynauds, Cardiac cath 

with stents.


Last Myocardial Infarction Date:: 2/8/20


History of Any Multi-Drug Resistant Organisms: None Reported


Past Surgical History: Appendectomy, Cholecystectomy, Heart Catheterization, 

Hysterectomy


Additional Past Surgical History / Comment(s): ESOPHAGUS SURGERY, FOOT SURGERY, 

LEFT KNEE SURGERY, PARATHYROID SURGERY, BILATERAL WRIST, colectory with ostomy 

placement.


Past Anesthesia/Blood Transfusion Reactions: No Reported Reaction


Past Psychological History: No Psychological Hx Reported


Past Alcohol Use History: Rare


Additional Past Alcohol Use History / Comment(s): STARTED SMOKING AT AGE 13 QUIT

SMOKING AT AGE 53 SMOKED 2-3PPD


Past Drug Use History: None Reported





- Past Family History


  ** Mother


Family Medical History: Cancer (Kidney)





  ** Father


Family Medical History: Cancer (Mouth cancer at 84)





  ** Daughter(s)


Family Medical History: Cancer (Lung cancer)





  ** Brother(s)


Family Medical History: No Reported History





  ** Sister(s)


Family Medical History: No Reported History





Medications and Allergies


                                Home Medications











 Medication  Instructions  Recorded  Confirmed  Type


 


Apixaban [Eliquis] 2.5 mg PO BID@0800,1700 02/08/20 05/10/21 History


 


ALPRAZolam [Xanax] 0.25 mg PO BID PRN 05/10/21 05/10/21 History


 


Acetaminophen Tab [Tylenol] 1,000 mg PO TID PRN 05/10/21 05/10/21 History


 


Amiodarone HCl [Pacerone] 200 mg PO DAILY@0800 05/10/21 05/10/21 History


 


Atorvastatin [Lipitor] 80 mg PO HS@2100 05/10/21 05/10/21 History


 


Collagenase [Santyl] 1 applic TOPICAL DAILY 05/10/21 05/10/21 History


 


Ipratropium-Albuterol Nebulize 3 ml PO QID@08,12,17,21 05/10/21 05/10/21 History





[Duoneb 0.5 mg-3 mg/3 ml Soln]    


 


Liquacel (Unknown Strength) 30 ml PO BID@0800,1200 05/10/21 05/10/21 History


 


Melatonin 1 mg PO HS@2100 05/10/21 05/10/21 History


 


Metoclopramide HCl [Reglan] 5 mg PO TID PRN 05/10/21 05/10/21 History


 


Metoprolol Tartrate [Lopressor] 25 mg PO BID@0800,1700 05/10/21 05/10/21 History


 


Midodrine HCl [ProAmatine] 10 mg PO TID@0800,1200,1700 05/10/21 05/10/21 History


 


Mirtazapine 7.5 mg PO HS@2100 05/10/21 05/10/21 History


 


Nepro 1 can PO TID@0800,1200,2100 05/10/21 05/10/21 History


 


Pantoprazole [Protonix] 40 mg PO DAILY@0600 05/10/21 05/10/21 History


 


Sertraline [Zoloft] 25 mg PO DAILY@0800 05/10/21 05/10/21 History


 


bisacodyL [Dulcolax] 10 mg RECTAL DAILY PRN 05/10/21 05/10/21 History


 


oxyCODONE HCL 5 mg PO TID PRN 05/10/21 05/10/21 History








                                    Allergies











Allergy/AdvReac Type Severity Reaction Status Date / Time


 


ciprofloxacin [From Cipro] Allergy  Nausea & Verified 05/10/21 21:29





   Vomiting  


 


Penicillins Allergy  Anaphylaxis Verified 05/10/21 21:29


 


venom-honey bee Allergy  Anaphylaxis Verified 05/10/21 21:29





[bee venom (honey bee)]     


 


venom-wasp [wasp venom] Allergy  Anaphylaxis Verified 05/10/21 21:29














Physical Exam


Vitals: 


                                   Vital Signs











  Temp Pulse Resp BP Pulse Ox


 


 05/11/21 10:39   67  16  109/63  96


 


 05/11/21 09:57   70  18  127/56  96


 


 05/11/21 09:36   69  18  107/47  96


 


 05/11/21 08:41   67  16  120/47  96


 


 05/11/21 07:26   67  18  105/43  96


 


 05/11/21 07:17   66  16  95/52  98


 


 05/11/21 06:32   66  16  102/45  95


 


 05/11/21 05:22     115/65 


 


 05/11/21 04:33  97.8 F    


 


 05/11/21 04:29   67  17  108/63  95


 


 05/11/21 02:31   70  16  115/40  95


 


 05/11/21 01:04   71  16  105/45  93 L


 


 05/10/21 22:10   71  16  114/44  98


 


 05/10/21 20:04   68  18  110/44  100


 


 05/10/21 19:54     72/46 


 


 05/10/21 19:27   67  16  116/75  100


 


 05/10/21 19:05   72  18  65/27  100


 


 05/10/21 18:56  97.5 F L  76  18  74/58  99








                                Intake and Output











 05/10/21 05/11/21 05/11/21





 22:59 06:59 14:59


 


Intake Total 0.880 9.98 21.005


 


Output Total   200


 


Balance 0.880 9.98 -178.995


 


Intake:   


 


  Intake, IV Titration 0.880 9.98 21.005





  Amount   


 


    Norepinephrine 32 mg In 0.880 9.98 21.005





    Sodium Chloride 0.9% 218   





    ml @ 0.05 MCG/KG/MIN 1.   





    052 mls/hr IV .Q24H ONE   





    Rx#:[id number]   


 


Output:   


 


  Urine   200


 


Other:   


 


  Weight 44.906 kg  











General Impression: Revealed an 80-year-old white female, frail looking, cache

ctic, in no distress, on few liters nasal cannula.


Head: Atraumatic, normocephalic.


HEENT:, extra-ocular movements intact, pupils equal and reactive to light 

bilaterally, dry mucous membranes, no neck masses, no JVD, no stridor.


Cardiovascular: Normal S1 and S2, no S3 gallop, 2/6 systolic murmur throughout 

the precordium.


Chest: Symmetrical chest expansion, diminished breath sounds and crackles at the

bases especially at the right base.  No rhonchi and no wheezes


Abdomen: Soft nontender no megaly no rebound, colostomy/functional noted.


Musculoskeletal: No deformities noted limitation range of motion.


Neurological: Alert and oriented 3 focal deficits.


Skin: Malodorous decubitus ulcer with mild surrounding erythema


Psychiatric: Normal mood affect and normal mental status examination.











Results





- Laboratory Findings


CBC and BMP: 


                                 05/11/21 08:13





                                 05/11/21 08:13


PT/INR, D-dimer











PT  13.2 sec (9.0-12.0)  H  05/10/21  18:46    


 


INR  1.3  (<1.2)  H  05/10/21  18:46    








Abnormal lab findings: 


                                  Abnormal Labs











  05/10/21 05/10/21 05/10/21





  18:46 18:46 18:46


 


WBC  15.1 H  


 


RBC  3.55 L  


 


MCV  110.3 H  


 


MCHC  30.7 L  


 


RDW  16.3 H  


 


Neutrophils #  14.2 H  


 


Lymphocytes #  0.5 L  


 


Macrocytosis  Marked A  


 


PT    13.2 H


 


INR    1.3 H


 


APTT    >200.0 H*


 


VBG pH   


 


VBG HCO3   


 


Chloride   


 


Carbon Dioxide   18 L 


 


BUN   47 H 


 


Creatinine   3.53 H 


 


Plasma Lactic Acid Adair   


 


Calcium   


 


Ionized Calcium Ariane   4.3 L 


 


Total Bilirubin   1.7 H 


 


AST   475 H 


 


ALT   136 H 


 


Alkaline Phosphatase   389 H 


 


Troponin I   


 


Total Protein   


 


Albumin   3.2 L 


 


Urine Appearance   


 


Urine Protein   


 


Urine Blood   


 


Ur Leukocyte Esterase   


 


Urine RBC   


 


Urine WBC   


 


Urine WBC Clumps   


 


Amorphous Sediment   


 


Urine Bacteria   


 


Hyaline Casts   


 


Urine Mucus   














  05/10/21 05/10/21 05/10/21





  18:46 18:46 18:46


 


WBC   


 


RBC   


 


MCV   


 


MCHC   


 


RDW   


 


Neutrophils #   


 


Lymphocytes #   


 


Macrocytosis   


 


PT   


 


INR   


 


APTT   


 


VBG pH    7.28 L


 


VBG HCO3    19 L


 


Chloride   


 


Carbon Dioxide   


 


BUN   


 


Creatinine   


 


Plasma Lactic Acid Adair  4.7 H*  


 


Calcium   


 


Ionized Calcium Ariane   


 


Total Bilirubin   


 


AST   


 


ALT   


 


Alkaline Phosphatase   


 


Troponin I   0.050 H* 


 


Total Protein   


 


Albumin   


 


Urine Appearance   


 


Urine Protein   


 


Urine Blood   


 


Ur Leukocyte Esterase   


 


Urine RBC   


 


Urine WBC   


 


Urine WBC Clumps   


 


Amorphous Sediment   


 


Urine Bacteria   


 


Hyaline Casts   


 


Urine Mucus   














  05/10/21 05/11/21 05/11/21





  21:37 02:06 05:47


 


WBC   


 


RBC   


 


MCV   


 


MCHC   


 


RDW   


 


Neutrophils #   


 


Lymphocytes #   


 


Macrocytosis   


 


PT   


 


INR   


 


APTT   


 


VBG pH   


 


VBG HCO3   


 


Chloride   


 


Carbon Dioxide   


 


BUN   


 


Creatinine   


 


Plasma Lactic Acid Adair  2.1 H*  


 


Calcium   


 


Ionized Calcium Ariane   


 


Total Bilirubin   


 


AST   


 


ALT   


 


Alkaline Phosphatase   


 


Troponin I   0.063 H*  0.066 H*


 


Total Protein   


 


Albumin   


 


Urine Appearance   


 


Urine Protein   


 


Urine Blood   


 


Ur Leukocyte Esterase   


 


Urine RBC   


 


Urine WBC   


 


Urine WBC Clumps   


 


Amorphous Sediment   


 


Urine Bacteria   


 


Hyaline Casts   


 


Urine Mucus   














  05/11/21 05/11/21 05/11/21





  08:13 08:13 10:07


 


WBC   13.4 H 


 


RBC   3.41 L 


 


MCV   113.4 H 


 


MCHC   29.6 L 


 


RDW   16.2 H 


 


Neutrophils #   12.1 H 


 


Lymphocytes #   0.6 L 


 


Macrocytosis   Marked A 


 


PT   


 


INR   


 


APTT   


 


VBG pH   


 


VBG HCO3   


 


Chloride  108 H  


 


Carbon Dioxide  18 L  


 


BUN  52 H  


 


Creatinine  3.76 H  


 


Plasma Lactic Acid Adair   


 


Calcium  7.8 L  


 


Ionized Calcium Ariane   


 


Total Bilirubin   


 


AST  307 H  


 


ALT  152 H  


 


Alkaline Phosphatase  298 H  


 


Troponin I   


 


Total Protein  5.4 L  


 


Albumin  2.6 L  


 


Urine Appearance    Turbid H


 


Urine Protein    2+ H


 


Urine Blood    Moderate H


 


Ur Leukocyte Esterase    Large H


 


Urine RBC    >182 H


 


Urine WBC    >182 H


 


Urine WBC Clumps    Many H


 


Amorphous Sediment    Moderate H


 


Urine Bacteria    Many H


 


Hyaline Casts    251 H


 


Urine Mucus    Many H














- Diagnostic Findings


Chest x-ray: image reviewed (As noted in HPI.)





Assessment and Plan


Assessment: 





Impression:


Cardiac arrest, exact etiology is not clear, patient required a brief period of 

CPR by hemodialysis staff and by EMS.  Did not require intubation and mechanical

ventilation.


Possible acute myocardial infarction, that is being addressed by cardiology who 

have already evaluated the patient.  Patient is known to have history of 

ischemic cardiomyopathy and LV dysfunction.


End-stage renal disease, on hemodialysis.


Suspect sepsis and possibly septic shock, likely sources are urine and sacral 

decubitus ulcer.  Hence the patient is presently on broad-spectrum antibiotics, 

and cultures are pending.


History of chronic atrial fibrillation, maintained on Eliquis and on beta 

blockers.


History of hypothyroidism.


Chronic anemia secondary to chronic disease/end-stage renal disease


Benign essential hypertension


History of dyslipidemia.


History of ischemic cardiomyopathy and LV dysfunction.


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


History of esophageal cancer and esophageal surgery





Recommendation:


Continue present supportive care measures.


Continue broad-spectrum antibiotics.


Cardiology to evaluate regarding her cardiac arrest and whether the patient 

needs further cardiac workup at this point.


Resume home meds


Titrate norepinephrine and possibly discontinue if possible today.


Consider transferring the patient to a cardiac bed if she is off norepinephrine 

and she tolerates being off norepinephrine.


Continue broad-spectrum antibiotics, and address accordingly based on the 

cultures from the urine and blood and sacral decubitus


GI and DVT prophylaxis.  Patient is normally on Eliquis.


Resume hemodialysis as her chest x-ray is showing unilateral pulmonary edema.


We will continue to follow.


Prognosis is guarded.











Time with Patient: Greater than 30

## 2021-05-11 NOTE — P.CRDCN
History of Present Illness


History of present illness: 





HISTORY OF PRESENTING ILLNESS


This is a pleasant 80-year-old  female past medical history significant

for end-stage renal disease on hemodialysis, coronary artery disease status post

PCI to the mid LAD February 2020 in the setting of an acute MI, paroxysmal 

atrial fibrillation and Eliquis, permanent pacemaker implantation ([facility]) 

March 2021, hypertension, dyslipidemia and former nicotine dependence on oxygen 

around the clock.  She follows in the office with Dr. Jaramillo. We have been asked

to see in consultation for possible cardiac arrest.  According to ER 

documentation the patient was at dialysis when she was found to be unresponsive.

 CPR was initiated.  She is seen and examined resting comfortably in bed in no 

acute distress at this time.  She is complaining of significant discomfort to 

her coccyx where she has a decubitus ulcer.  She denies chest pain, shortness of

breath, dizziness or palpitations.  She does not recall the events surrounding 

what occurred yesterday at dialysis.





DIAGNOSTICS


EKG reveals sinus mechanism, first-degree AV block and left bundle branch block.


Chest xray is read on admission revealed coarse interstitial infiltrate of the 

right lung, no overt heart failure, mild volume loss in the right lung.  Repeat 

this morning revealed bilateral infiltrate and pleural effusions greater on the 

right.


Laboratory reviewed, WBC on admission 15.1 repeat today 13.4, hemoglobin 11.5, 

platelets 184, sodium 139, potassium 4.8, creatinine 3.76, lactic acid on 

admission 2. 1 repeat today 1.4, magnesium 2.3, troponin 0.05, 0.06 and 0.06, 

TSH 3.36.


Current cardiac medications include amiodarone 200 mg daily, atorvastatin 80 mg 

daily, Eliquis 2.5 mg twice a day, Lopressor 25 mg twice a day and midodrine 10 

mg 3 times a day.


Most recent echocardiogram obtained March 2021 revealed preserved LV systolic 

function with ejection fraction 50-55%, severe mitral regurgitation, severe 

tricuspid regurgitation and moderate to severe AR and pulmonary hypertension 

with RVSP of 39 mmHg. 





REVIEW OF SYSTEMS


At the time of my exam:


CONSTITUTIONAL: Denies fever or chills.


CARDIOVASCULAR: Denies chest pain, shortness of breath, orthopnea, PND or 

palpitations.


RESPIRATORY: Denies cough. 


GASTROINTESTINAL: Denies abdominal pain, diarrhea, constipation, nausea or 

vomiting.


MUSCULOSKELETAL: Complains of pain to her coccyx


NEUROLOGIC: Denies numbness, tingling, headacbe or weakness.


ENDOCRINE: Denies fatigue, weight change,  polydipsia or polyurina.


GENITOURINARY: Denies burning, hematuria or urgency with micturation.


HEMATOLOGIC: Denies history of anemia or bleeding. 





PHYSICAL EXAMINATION


Blood pressure 111/53 heart rate 69 afebrile and maintaining oxygen saturation 

on nasal cannula.


CONSTITUTIONAL: No apparent distress. 


HEENT: Head is normocephalic. Pupils are equal, round. Sclerae anicteric. Mucous

membranes of the mouth are moist.  No JVD. No carotid bruit.


CHEST EXAMINATION: Rales at the right base, clear on the left but diminished. No

wheezes or rhonchi. No chest wall tenderness is noted on palpation or with deep 

breathing. 


HEART EXAMINATION: Regular rate and rhythm. S1, S2 heard.  Systolic ejection 

murmur at the base, blowing diastolic murmur at the apex, no gallops or rub.


ABDOMEN: Soft, nontender. Positive bowel sounds.


EXTREMITIES: 2+ peripheral pulses, no lower extremity edema and no calf 

tenderness.


NEUROLOGIC EXAMINATION: Patient is awake, alert and oriented x3. 





ASSESSMENT


Altered mental status


End stage renal disease on HD


Coronary artery disease


Permanent pacemaker implantation


Decubitus ulcer


Leukocytosis


Lactic acidosis


Troponin leak secondary to kidney disease not indicative of myocardial ischemia





PLAN


Pacemaker interrogation requested and reviewed, no alerts or events reported. 

Unlikely this was a true cardiac arrest given this information. 


Ongoing antibiotic therapy for suspected sepsis due to decubitus ulcer with foul

odor. 


Resume amiodarone, atorvastatin and eliquis as previously ordered. 


Hold lopressor due to hypotension requiring pressors. Resume when her blood 

pressure is stable on its own. 


No further cardiac testing required at this time. 


Thank you kindly for this consultation.








Nurse Practitioner note has been reviewed, I agree with a documented findings 

and plan of care.  Patient was seen and examined.











Past Medical History


Past Medical History: Atrial Fibrillation, Coronary Artery Disease (CAD), 

Cancer, Chest Pain / Angina, Hypertension, Myocardial Infarction (MI), 

Osteoarthritis (OA), Thyroid Disorder


Additional Past Medical History / Comment(s): " LEAKY VALVES", ESOPHAGEAL 

CANCER,  DECREASED KIDNEY FUNCTION, CKD, ischemic bowel, Raynauds, Cardiac cath 

with stents.


Last Myocardial Infarction Date:: 2/8/20


History of Any Multi-Drug Resistant Organisms: None Reported


Past Surgical History: Appendectomy, Cholecystectomy, Heart Catheterization, 

Hysterectomy


Additional Past Surgical History / Comment(s): ESOPHAGUS SURGERY, FOOT SURGERY, 

LEFT KNEE SURGERY, PARATHYROID SURGERY, BILATERAL WRIST, colectory with ostomy 

placement.


Past Anesthesia/Blood Transfusion Reactions: No Reported Reaction


Past Psychological History: No Psychological Hx Reported


Past Alcohol Use History: Rare


Additional Past Alcohol Use History / Comment(s): STARTED SMOKING AT AGE 13 QUIT

SMOKING AT AGE 53 SMOKED 2-3PPD


Past Drug Use History: None Reported





- Past Family History


  ** Mother


Family Medical History: Cancer (Kidney)





  ** Father


Family Medical History: Cancer (Mouth cancer at 84)





  ** Daughter(s)


Family Medical History: Cancer (Lung cancer)





  ** Brother(s)


Family Medical History: No Reported History





  ** Sister(s)


Family Medical History: No Reported History





Medications and Allergies


                                Home Medications











 Medication  Instructions  Recorded  Confirmed  Type


 


Apixaban [Eliquis] 2.5 mg PO BID@0800,1700 02/08/20 05/10/21 History


 


ALPRAZolam [Xanax] 0.25 mg PO BID PRN 05/10/21 05/10/21 History


 


Acetaminophen Tab [Tylenol] 1,000 mg PO TID PRN 05/10/21 05/10/21 History


 


Amiodarone HCl [Pacerone] 200 mg PO DAILY@0800 05/10/21 05/10/21 History


 


Atorvastatin [Lipitor] 80 mg PO HS@2100 05/10/21 05/10/21 History


 


Collagenase [Santyl] 1 applic TOPICAL DAILY 05/10/21 05/10/21 History


 


Ipratropium-Albuterol Nebulize 3 ml PO QID@08,12,17,21 05/10/21 05/10/21 History





[Duoneb 0.5 mg-3 mg/3 ml Soln]    


 


Liquacel (Unknown Strength) 30 ml PO BID@0800,1200 05/10/21 05/10/21 History


 


Melatonin 1 mg PO HS@2100 05/10/21 05/10/21 History


 


Metoclopramide HCl [Reglan] 5 mg PO TID PRN 05/10/21 05/10/21 History


 


Metoprolol Tartrate [Lopressor] 25 mg PO BID@0800,1700 05/10/21 05/10/21 History


 


Midodrine HCl [ProAmatine] 10 mg PO TID@0800,1200,1700 05/10/21 05/10/21 History


 


Mirtazapine 7.5 mg PO HS@2100 05/10/21 05/10/21 History


 


Nepro 1 can PO TID@0800,1200,2100 05/10/21 05/10/21 History


 


Pantoprazole [Protonix] 40 mg PO DAILY@0600 05/10/21 05/10/21 History


 


Sertraline [Zoloft] 25 mg PO DAILY@0800 05/10/21 05/10/21 History


 


bisacodyL [Dulcolax] 10 mg RECTAL DAILY PRN 05/10/21 05/10/21 History


 


oxyCODONE HCL 5 mg PO TID PRN 05/10/21 05/10/21 History








                                    Allergies











Allergy/AdvReac Type Severity Reaction Status Date / Time


 


ciprofloxacin [From Cipro] Allergy  Nausea & Verified 05/10/21 21:29





   Vomiting  


 


Penicillins Allergy  Anaphylaxis Verified 05/10/21 21:29


 


venom-honey bee Allergy  Anaphylaxis Verified 05/10/21 21:29





[bee venom (honey bee)]     


 


venom-wasp [wasp venom] Allergy  Anaphylaxis Verified 05/10/21 21:29














Physical Exam


Vitals: 


                                   Vital Signs











  Temp Pulse Resp BP Pulse Ox


 


 05/11/21 08:41   67  16  120/47  96


 


 05/11/21 07:26   67  18  105/43  96


 


 05/11/21 07:17   66  16  95/52  98


 


 05/11/21 06:32   66  16  102/45  95


 


 05/11/21 05:22     115/65 


 


 05/11/21 04:33  97.8 F    


 


 05/11/21 04:29   67  17  108/63  95


 


 05/11/21 02:31   70  16  115/40  95


 


 05/11/21 01:04   71  16  105/45  93 L


 


 05/10/21 22:10   71  16  114/44  98


 


 05/10/21 20:04   68  18  110/44  100


 


 05/10/21 19:54     72/46 


 


 05/10/21 19:27   67  16  116/75  100


 


 05/10/21 19:05   72  18  65/27  100


 


 05/10/21 18:56  97.5 F L  76  18  74/58  99








                                Intake and Output











 05/10/21 05/11/21 05/11/21





 22:59 06:59 14:59


 


Intake Total 0.880 9.98 18.096


 


Output Total   200


 


Balance 0.880 9.98 -181.904


 


Intake:   


 


  Intake, IV Titration 0.880 9.98 18.096





  Amount   


 


    Norepinephrine 32 mg In 0.880 9.98 18.096





    Sodium Chloride 0.9% 218   





    ml @ 0.05 MCG/KG/MIN 1.   





    052 mls/hr IV .Q24H ONE   





    Rx#:[id number]   


 


Output:   


 


  Urine   200


 


Other:   


 


  Weight 44.906 kg  














Results





                                 05/11/21 08:13





                                 05/11/21 08:13


                                 Cardiac Enzymes











  05/10/21 05/10/21 05/11/21 Range/Units





  18:46 18:46 02:06 


 


AST  475 H    (14-36)  U/L


 


Troponin I   0.050 H*  0.063 H*  (0.000-0.034)  ng/mL














  05/11/21 05/11/21 Range/Units





  05:47 08:13 


 


AST   307 H  (14-36)  U/L


 


Troponin I  0.066 H*   (0.000-0.034)  ng/mL








                                   Coagulation











  05/10/21 Range/Units





  18:46 


 


PT  13.2 H  (9.0-12.0)  sec


 


APTT  >200.0 H*  (22.0-30.0)  sec








                                       CBC











  05/10/21 Range/Units





  18:46 


 


WBC  15.1 H  (3.8-10.6)  k/uL


 


RBC  3.55 L  (3.80-5.40)  m/uL


 


Hgb  12.0  D  (11.4-16.0)  gm/dL


 


Hct  39.2  (34.0-46.0)  %


 


Plt Count  205  D  (150-450)  k/uL








                          Comprehensive Metabolic Panel











  05/10/21 05/11/21 Range/Units





  18:46 08:13 


 


Sodium  137  139  (137-145)  mmol/L


 


Potassium  3.6  4.8  (3.5-5.1)  mmol/L


 


Chloride  101  108 H  ()  mmol/L


 


Carbon Dioxide  18 L  18 L  (22-30)  mmol/L


 


BUN  47 H  52 H  (7-17)  mg/dL


 


Creatinine  3.53 H  3.76 H  (0.52-1.04)  mg/dL


 


Glucose  93  91  (74-99)  mg/dL


 


Calcium  8.6  7.8 L  (8.4-10.2)  mg/dL


 


AST  475 H  307 H  (14-36)  U/L


 


ALT  136 H  152 H  (4-34)  U/L


 


Alkaline Phosphatase  389 H  298 H  ()  U/L


 


Total Protein  6.5  5.4 L  (6.3-8.2)  g/dL


 


Albumin  3.2 L  2.6 L  (3.5-5.0)  g/dL








                               Current Medications











Generic Name Dose Route Start Last Admin





  Trade Name Freq  PRN Reason Stop Dose Admin


 


Norepinephrine Bitartrate 32  250 mls @ 1.052 mls/hr  05/10/21 19:15  05/11/21 

08:59





  mg/ Sodium Chloride  IV  05/11/21 19:14  0.09 mcg/kg/min





  .Q24H ONE   1.895 mls/hr





    Titration





  Protocol  





  0.05 MCG/KG/MIN  


 


Vancomycin HCl 750 mg/ Sodium  250 mls @ 125 mls/hr  05/11/21 14:00 





  Chloride  IVPB  05/11/21 15:59 





  ONCE ONE  


 


Sodium Chloride  1,000 mls @ 80 mls/hr  05/10/21 20:00  05/10/21 20:28





  Saline 0.9%  IV   80 mls/hr





  .[id number] JOSE   Administration


 


Miscellaneous Information  1 each  05/10/21 19:19 





  Vancomycin Iv Per Pharmacy 1 Each Misc  MISCELLANE  





  AS DIRECTED PRN  





  Per Protocol  





  Protocol  


 


Naloxone HCl  0.2 mg  05/10/21 19:50 





  Naloxone 0.4 Mg/Ml 1 Ml Vial  IV  





  Q2M PRN  





  Opioid Reversal  








                                Intake and Output











 05/10/21 05/11/21 05/11/21





 22:59 06:59 14:59


 


Intake Total 0.880 9.98 18.096


 


Output Total   200


 


Balance 0.880 9.98 -181.904


 


Intake:   


 


  Intake, IV Titration 0.880 9.98 18.096





  Amount   


 


    Norepinephrine 32 mg In 0.880 9.98 18.096





    Sodium Chloride 0.9% 218   





    ml @ 0.05 MCG/KG/MIN 1.   





    052 mls/hr IV .Q24H ONE   





    Rx#:[id number]   


 


Output:   


 


  Urine   200


 


Other:   


 


  Weight 44.906 kg  








                                        





                                 05/10/21 18:46 





                                 05/11/21 08:13

## 2021-05-11 NOTE — P.NPCON
History of Present Illness





- Reason for Consult


acute renal failure





- History of Present Illness





Reason for consultation: Acute kidney injury





History of present illness:


Patient is a 80-year-old female seen in consultation for acute kidney injury, 

hemodialysis dependent.  Patient was seen and examined in the emergency room.  

She is currently maintained on hemodialysis on Monday Wednesday Friday schedule.

 She went to hemodialysis yesterday and became unresponsive.  CPR was initiated 

and EMS was called to bring her to the hospital.  She is currently on Levophed. 

She is receiving IV fluids.  Patient does have history of A. fib as well as 

coronary artery disease.  Blood pressures currently stable.  She is on room air.

 Patient is quite frail and lethargic and not a reliable historian.  Chest x-ray

revealed bilateral infiltrates as well as pleural effusions.  Echocardiogram 

from February 2020 revealed ejection fraction of 30-35% with moderate to severe 

mitral and tricuspid regurgitation.  She was also noted to have severe pulmonary

hypertension.  She denies any active chest pain or shortness of breath.  Denies 

vomiting or diarrhea.  She tested negative for coronavirus.  Afebrile.





Vital signs are stable.


General: Frail.


HEENT: Head exam is unremarkable.


LUNGS: Breath sounds decreased.


HEART: Rate and Rhythm are regular. 


ABDOMEN: Soft, no distention.


EXTREMITITES: No edema.





Past Medical History


Past Medical History: Atrial Fibrillation, Coronary Artery Disease (CAD), 

Cancer, Chest Pain / Angina, Hypertension, Myocardial Infarction (MI), Osteoa

rthritis (OA), Thyroid Disorder


Additional Past Medical History / Comment(s): " LEAKY VALVES", ESOPHAGEAL 

CANCER,  DECREASED KIDNEY FUNCTION, CKD, ischemic bowel, Raynauds, Cardiac cath 

with stents.


Last Myocardial Infarction Date:: 2/8/20


History of Any Multi-Drug Resistant Organisms: None Reported


Past Surgical History: Appendectomy, Cholecystectomy, Heart Catheterization, 

Hysterectomy


Additional Past Surgical History / Comment(s): ESOPHAGUS SURGERY, FOOT SURGERY, 

LEFT KNEE SURGERY, PARATHYROID SURGERY, BILATERAL WRIST, colectory with ostomy 

placement.


Past Anesthesia/Blood Transfusion Reactions: No Reported Reaction


Past Psychological History: No Psychological Hx Reported


Past Alcohol Use History: Rare


Additional Past Alcohol Use History / Comment(s): STARTED SMOKING AT AGE 13 QUIT

SMOKING AT AGE 53 SMOKED 2-3PPD


Past Drug Use History: None Reported





- Past Family History


  ** Mother


Family Medical History: Cancer (Kidney)





  ** Father


Family Medical History: Cancer (Mouth cancer at 84)





  ** Daughter(s)


Family Medical History: Cancer (Lung cancer)





  ** Brother(s)


Family Medical History: No Reported History





  ** Sister(s)


Family Medical History: No Reported History





Medications and Allergies


                                Home Medications











 Medication  Instructions  Recorded  Confirmed  Type


 


Apixaban [Eliquis] 2.5 mg PO BID@0800,1700 02/08/20 05/10/21 History


 


ALPRAZolam [Xanax] 0.25 mg PO BID PRN 05/10/21 05/10/21 History


 


Acetaminophen Tab [Tylenol] 1,000 mg PO TID PRN 05/10/21 05/10/21 History


 


Amiodarone HCl [Pacerone] 200 mg PO DAILY@0800 05/10/21 05/10/21 History


 


Atorvastatin [Lipitor] 80 mg PO HS@2100 05/10/21 05/10/21 History


 


Collagenase [Santyl] 1 applic TOPICAL DAILY 05/10/21 05/10/21 History


 


Ipratropium-Albuterol Nebulize 3 ml PO QID@08,12,17,21 05/10/21 05/10/21 History





[Duoneb 0.5 mg-3 mg/3 ml Soln]    


 


Liquacel (Unknown Strength) 30 ml PO BID@0800,1200 05/10/21 05/10/21 History


 


Melatonin 1 mg PO HS@2100 05/10/21 05/10/21 History


 


Metoclopramide HCl [Reglan] 5 mg PO TID PRN 05/10/21 05/10/21 History


 


Metoprolol Tartrate [Lopressor] 25 mg PO BID@0800,1700 05/10/21 05/10/21 History


 


Midodrine HCl [ProAmatine] 10 mg PO TID@0800,1200,1700 05/10/21 05/10/21 History


 


Mirtazapine 7.5 mg PO HS@2100 05/10/21 05/10/21 History


 


Nepro 1 can PO TID@0800,1200,2100 05/10/21 05/10/21 History


 


Pantoprazole [Protonix] 40 mg PO DAILY@0600 05/10/21 05/10/21 History


 


Sertraline [Zoloft] 25 mg PO DAILY@0800 05/10/21 05/10/21 History


 


bisacodyL [Dulcolax] 10 mg RECTAL DAILY PRN 05/10/21 05/10/21 History


 


oxyCODONE HCL 5 mg PO TID PRN 05/10/21 05/10/21 History








                                    Allergies











Allergy/AdvReac Type Severity Reaction Status Date / Time


 


ciprofloxacin [From Cipro] Allergy  Nausea & Verified 05/10/21 21:29





   Vomiting  


 


Penicillins Allergy  Anaphylaxis Verified 05/10/21 21:29


 


venom-honey bee Allergy  Anaphylaxis Verified 05/10/21 21:29





[bee venom (honey bee)]     


 


venom-wasp [wasp venom] Allergy  Anaphylaxis Verified 05/10/21 21:29














Physical Exam


Vitals: 


                                   Vital Signs











  Temp Pulse Resp BP Pulse Ox


 


 05/11/21 09:57   70  18  127/56  96


 


 05/11/21 09:36   69  18  107/47  96


 


 05/11/21 08:41   67  16  120/47  96


 


 05/11/21 07:26   67  18  105/43  96


 


 05/11/21 07:17   66  16  95/52  98


 


 05/11/21 06:32   66  16  102/45  95


 


 05/11/21 05:22     115/65 


 


 05/11/21 04:33  97.8 F    


 


 05/11/21 04:29   67  17  108/63  95


 


 05/11/21 02:31   70  16  115/40  95


 


 05/11/21 01:04   71  16  105/45  93 L


 


 05/10/21 22:10   71  16  114/44  98


 


 05/10/21 20:04   68  18  110/44  100


 


 05/10/21 19:54     72/46 


 


 05/10/21 19:27   67  16  116/75  100


 


 05/10/21 19:05   72  18  65/27  100


 


 05/10/21 18:56  97.5 F L  76  18  74/58  99








                                Intake and Output











 05/10/21 05/11/21 05/11/21





 22:59 06:59 14:59


 


Intake Total 0.880 9.98 20.023


 


Output Total   200


 


Balance 0.880 9.98 -179.977


 


Intake:   


 


  Intake, IV Titration 0.880 9.98 20.023





  Amount   


 


    Norepinephrine 32 mg In 0.880 9.98 20.023





    Sodium Chloride 0.9% 218   





    ml @ 0.05 MCG/KG/MIN 1.   





    052 mls/hr IV .Q24H ONE   





    Rx#:[id number]   


 


Output:   


 


  Urine   200


 


Other:   


 


  Weight 44.906 kg  














Results





- Lab Results


                             Most recent lab results











Calcium  7.8 mg/dL (8.4-10.2)  L  05/11/21  08:13    


 


Magnesium  2.3 mg/dL (1.6-2.3)   05/10/21  18:46    














                                 05/11/21 08:13





                                 05/11/21 08:13





Assessment and Plan


Plan: 





Assessment:


1.  Acute kidney injury, currently hemodialysis dependent.  She is maintained on

hemodialysis on Monday Wednesday Friday schedule outpatient.  She has a groin 

femoral catheter.


2.  Status post cardiac arrest.


3.  Metabolic acidosis secondary to acute kidney injury.  Expect improvement 

postdialysis.


4.  Pneumonia maintained on antibiotics.


5.  Shock maintained on Levophed.





Plan:


Hemodialysis today as she did not receive much treatment yesterday.


Plan for another treatment tomorrow per her outpatient schedule.


Follow-up cultures.


Monitor vancomycin levels.  Dose to be adjusted for renal function.


Check phosphorus level.


Decreased rate of normal saline to 50 mL an hour.


Wean vasopressors.





Thank you for the consultation.  I will continue to follow the patient with you 

during her hospital stay.

## 2021-05-12 LAB
ALBUMIN SERPL-MCNC: 2.2 G/DL (ref 3.5–5)
ALP SERPL-CCNC: 219 U/L (ref 38–126)
ALT SERPL-CCNC: 110 U/L (ref 4–34)
ANION GAP SERPL CALC-SCNC: 9 MMOL/L
AST SERPL-CCNC: 149 U/L (ref 14–36)
BASOPHILS # BLD AUTO: 0 K/UL (ref 0–0.2)
BASOPHILS NFR BLD AUTO: 0 %
BUN SERPL-SCNC: 27 MG/DL (ref 7–17)
CALCIUM SPEC-MCNC: 8.1 MG/DL (ref 8.4–10.2)
CHLORIDE SERPL-SCNC: 116 MMOL/L (ref 98–107)
CO2 SERPL-SCNC: 16 MMOL/L (ref 22–30)
EOSINOPHIL # BLD AUTO: 0 K/UL (ref 0–0.7)
EOSINOPHIL NFR BLD AUTO: 1 %
ERYTHROCYTE [DISTWIDTH] IN BLOOD BY AUTOMATED COUNT: 2.59 M/UL (ref 3.8–5.4)
ERYTHROCYTE [DISTWIDTH] IN BLOOD: 16 % (ref 11.5–15.5)
GLUCOSE SERPL-MCNC: 65 MG/DL (ref 74–99)
HCT VFR BLD AUTO: 29.7 % (ref 34–46)
HGB BLD-MCNC: 9.3 GM/DL (ref 11.4–16)
LYMPHOCYTES # SPEC AUTO: 0.3 K/UL (ref 1–4.8)
LYMPHOCYTES NFR SPEC AUTO: 7 %
MCH RBC QN AUTO: 35.7 PG (ref 25–35)
MCHC RBC AUTO-ENTMCNC: 31.1 G/DL (ref 31–37)
MCV RBC AUTO: 114.7 FL (ref 80–100)
MONOCYTES # BLD AUTO: 0.3 K/UL (ref 0–1)
MONOCYTES NFR BLD AUTO: 5 %
NEUTROPHILS # BLD AUTO: 4.1 K/UL (ref 1.3–7.7)
NEUTROPHILS NFR BLD AUTO: 86 %
PLATELET # BLD AUTO: 61 K/UL (ref 150–450)
POTASSIUM SERPL-SCNC: 3.6 MMOL/L (ref 3.5–5.1)
PROT SERPL-MCNC: 4.7 G/DL (ref 6.3–8.2)
SODIUM SERPL-SCNC: 141 MMOL/L (ref 137–145)
VANCOMYCIN SERPL-MCNC: 11.5 UG/ML
WBC # BLD AUTO: 4.8 K/UL (ref 3.8–10.6)

## 2021-05-12 RX ADMIN — APIXABAN SCH MG: 2.5 TABLET, FILM COATED ORAL at 09:11

## 2021-05-12 RX ADMIN — Medication SCH MG: at 14:41

## 2021-05-12 RX ADMIN — Medication SCH MG: at 20:41

## 2021-05-12 RX ADMIN — ATORVASTATIN CALCIUM SCH MG: 80 TABLET, FILM COATED ORAL at 20:41

## 2021-05-12 RX ADMIN — CEFAZOLIN SCH MLS/HR: 330 INJECTION, POWDER, FOR SOLUTION INTRAMUSCULAR; INTRAVENOUS at 09:12

## 2021-05-12 RX ADMIN — Medication SCH: at 17:19

## 2021-05-12 RX ADMIN — MIDODRINE HYDROCHLORIDE SCH MG: 5 TABLET ORAL at 17:21

## 2021-05-12 RX ADMIN — COLLAGENASE SANTYL SCH APPLIC: 250 OINTMENT TOPICAL at 17:22

## 2021-05-12 RX ADMIN — APIXABAN SCH MG: 2.5 TABLET, FILM COATED ORAL at 17:21

## 2021-05-12 RX ADMIN — MIRTAZAPINE SCH MG: 15 TABLET, FILM COATED ORAL at 20:40

## 2021-05-12 RX ADMIN — CEFAZOLIN SCH: 330 INJECTION, POWDER, FOR SOLUTION INTRAMUSCULAR; INTRAVENOUS at 20:42

## 2021-05-12 RX ADMIN — CEFEPIME HYDROCHLORIDE SCH MLS/HR: 1 INJECTION, POWDER, FOR SOLUTION INTRAMUSCULAR; INTRAVENOUS at 20:41

## 2021-05-12 RX ADMIN — CEFAZOLIN SCH: 330 INJECTION, POWDER, FOR SOLUTION INTRAMUSCULAR; INTRAVENOUS at 14:41

## 2021-05-12 RX ADMIN — AMIODARONE HYDROCHLORIDE SCH MG: 200 TABLET ORAL at 09:11

## 2021-05-12 RX ADMIN — Medication SCH MG: at 20:40

## 2021-05-12 NOTE — P.PN
Subjective





Patient is seen in follow-up for acute kidney injury, hemodialysis dependent.  

She is maintained on hemodialysis on Monday Wednesday Friday schedule.  Blood 

pressure on the lower side.  On 5 L nasal cannula.  Quite weak.  Son present at 

bedside.





Vital signs are stable.


General: The patient appeared well nourished and normally developed. 


HEENT: Head exam is unremarkable. Neck is without jugular venous distension.


LUNGS: Breath sounds decreased.


HEART: Rate and Rhythm are regular.


ABDOMEN: Soft, no distention.


EXTREMITITES: No edema.





Objective





- Vital Signs


Vital signs: 


                                   Vital Signs











Temp  97.9 F   05/12/21 12:00


 


Pulse  67   05/12/21 12:00


 


Resp  18   05/12/21 12:00


 


BP  106/54   05/12/21 12:00


 


Pulse Ox  96   05/12/21 12:00








                                 Intake & Output











 05/11/21 05/12/21 05/12/21





 18:59 06:59 18:59


 


Intake Total 24.825  0


 


Output Total 250  50


 


Balance -225.175  -50


 


Weight 44.906 kg 50 kg 


 


Intake:   


 


  Intake, IV Titration 24.825  





  Amount   


 


    Norepinephrine 32 mg In 24.825  





    Sodium Chloride 0.9% 218   





    ml @ 0.05 MCG/KG/MIN 1.   





    052 mls/hr IV .Q24H ONE   





    Rx#:[id number]   


 


  Oral   0


 


Output:   


 


  Urine 250  


 


  Stool   50


 


  Hemodialysis 0  


 


Other:   


 


  Voiding Method  Indwelling Catheter Indwelling Catheter














- Labs


CBC & Chem 7: 


                                 05/12/21 09:42





                                 05/12/21 07:28


Labs: 


                  Abnormal Lab Results - Last 24 Hours (Table)











  05/12/21 05/12/21 Range/Units





  07:28 09:42 


 


RBC   2.59 L  (3.80-5.40)  m/uL


 


Hgb   9.3 L D  (11.4-16.0)  gm/dL


 


Hct   29.7 L  (34.0-46.0)  %


 


MCV   114.7 H  (80.0-100.0)  fL


 


MCH   35.7 H  (25.0-35.0)  pg


 


RDW   16.0 H  (11.5-15.5)  %


 


Plt Count   61 L D  (150-450)  k/uL


 


Lymphocytes #   0.3 L  (1.0-4.8)  k/uL


 


Macrocytosis   Marked A  


 


Chloride  116 H   ()  mmol/L


 


Carbon Dioxide  16 L   (22-30)  mmol/L


 


BUN  27 H   (7-17)  mg/dL


 


Creatinine  2.35 H   (0.52-1.04)  mg/dL


 


Glucose  65 L   (74-99)  mg/dL


 


Calcium  8.1 L   (8.4-10.2)  mg/dL


 


AST  149 H   (14-36)  U/L


 


ALT  110 H   (4-34)  U/L


 


Alkaline Phosphatase  219 H   ()  U/L


 


Total Protein  4.7 L   (6.3-8.2)  g/dL


 


Albumin  2.2 L   (3.5-5.0)  g/dL








                      Microbiology - Last 24 Hours (Table)











 05/10/21 18:46 Blood Culture - Preliminary





 Blood    No Growth after 24 hours


 


 05/11/21 10:07 Urine Culture - Preliminary





 Urine,Voided 














Assessment and Plan


Plan: 





Assessment:


1.  Acute kidney injury, currently hemodialysis dependent.  She is maintained on

hemodialysis on Monday Wednesday Friday schedule outpatient.  She has a groin 

femoral catheter.


2.  Status post cardiac arrest.


3.  Metabolic acidosis secondary to acute kidney injury. 


4.  Pneumonia maintained on antibiotics.


5.  Shock s/p Levophed.


6.  Anemia of chronic kidney disease.  With iron deficiency.





Plan:


Hemodialysis today.


Resume midodrine.


Phosphorus normal.


Add oral bicarb.


Check iron studies.


Add Aranesp.


Discussed with the patient and her son regarding long-term care.  Patient bec

omes quite hypotensive during dialysis and has difficulty with ultrafiltration.


Comfort measures were discussed.  They also wish to have the conversation with 

the primary attending.


Overall prognosis guarded.

## 2021-05-12 NOTE — P.PN
Subjective


Progress Note Date: 05/12/21


Principal diagnosis: 


Cardiac arrest





This is an 80-year-old female, known history of end-stage renal disease, patient

is hemodialysis dependent.  Patient is normally on dialysis on Monday Wednesday 

and Friday schedule.  Patient was in the hemodialysis unit yesterday, and she 

became unresponsive.  This happened around the time of the initiation of 

dialysis treatment patient went unresponsive and went cyanotic.  CPR was started

by the hemodialysis unit staff, and EMS responded and found the patient cyanotic

and unresponsive.  In route to the emergency room, patient had return of 

spontaneous circulation, and she became responsive again, and she did not 

require intubation or mechanical ventilation.  However the patient was 

hypotensive requiring starting norepinephrine and she had a central line placed 

in her right groin by the ER physician.  Patient had no complaints except for 

pain in the tailbone area she had no shortness of breath, no cough, no wheezing,

no nausea, no vomiting, no abdominal pain patient was given fluid boluses in the

ER, and she was started empirically on antibiotics since she had 2 potential 

sources of sepsis including urine and sacral decubitus ulcer.  And leukocytosis 

with WBC count of 15.1.  She had a low bicarb of 18 with anion gap of 18 her BUN

was 47 creatinine 3.5 and lactic acid was 4.7.  Troponin was 0.05.  Patient 

remained in the ER overnight, and she was accepted to the unit but no beds 

available I evaluated the patient in the ER, she is on very minimal dose of 

norepinephrine which will likely be discontinued today.  And the patient remains

hemodynamically stable and she is only on few liters nasal cannula, then we can 

potentially continue present supportive care measures including antibiotics, 

hemodialysis, and potentially transfer the patient to a monitored bed on 

selective instead of ICU bed.  During my evaluation to the patient in the ER, 

she is awake, alert and oriented 3, and not in any distress.  Patient is known 

to have history of cardiomyopathy and LV dysfunction, her last ejection fraction

in February 2020 was 30-35%.  She is also known to have history of severe 

pulmonary hypertension and moderate to severe mitral regurgitation.  PCR for 

COVID-19 was negative.  Chest x-ray this morning showed bilateral infiltrates 

and pleural effusion, I'm strongly suspecting that the patient has unilateral 

pulmonary edema, underlying pneumonia especially aspiration pneumonia is not 

entirely ruled out





On 05/12/2021 patient seen in follow-up selective care unit, she is resting 

comfortably in bed, she appears very frail and weak but appears to be in no 

acute distress, her son is at the bedside.  Her son told us that yesterday 

during hemodialysis patient had a similar episode as the one that brought her to

the hospital initially and the dialysis had to be cut short, patient did not 

become unresponsive but she started experiencing numbness, and headaches, and 

her dialysis had to be cut short, and not much fluid was able to be removed.  

Today's chest x-ray today shows airspace infiltrate throughout the right lung, 

changed or may be slightly more consolidated.  Possible aspiration pneumonia is 

suspected.  This had no fever or chills since admission, she is currently 

requiring 5 L of supplemental oxygen, her pulse ox is 96-98%, blood pressures 

106/54, she is breathing comfortably, no complaints of chest discomfort, no 

hemoptysis, appears very weak and debilitated.  She is on antibiotic coverage in

the form of cefepime and Vancomycin for possibility of aspiration pneumonia.  

Her blood and urine cultures have been negative.  Today's labs have been 

reviewed, white blood cell count is improved, is down to 4.8, hemoglobin is 9.3,

platelet count is 61, sodium is 141, potassium 3.6, chloride is 116, CO2 is 16, 

BUN of 27, and creatinine is 2.35, LFTs are improving, urinalysis showed 

infected urine.








Objective





- Vital Signs


Vital signs: 


                                   Vital Signs











Temp  97.9 F   05/12/21 12:00


 


Pulse  67   05/12/21 12:00


 


Resp  18   05/12/21 12:00


 


BP  106/54   05/12/21 12:00


 


Pulse Ox  96   05/12/21 12:00








                                 Intake & Output











 05/11/21 05/12/21 05/12/21





 18:59 06:59 18:59


 


Intake Total 24.825  0


 


Output Total 250  50


 


Balance -225.175  -50


 


Weight 44.906 kg 50 kg 


 


Intake:   


 


  Intake, IV Titration 24.825  





  Amount   


 


    Norepinephrine 32 mg In 24.825  





    Sodium Chloride 0.9% 218   





    ml @ 0.05 MCG/KG/MIN 1.   





    052 mls/hr IV .Q24H ONE   





    Rx#:[id number]   


 


  Oral   0


 


Output:   


 


  Urine 250  


 


  Stool   50


 


  Hemodialysis 0  


 


Other:   


 


  Voiding Method  Indwelling Catheter Indwelling Catheter














- Exam


 GENERAL EXAM: Alert, weak, and debilitated, 80-year-old white female, 

comfortable.  His oxygen with pulse ox of 96-98%, comfortable in no apparent 

distress.


HEAD: Normocephalic/atraumatic.


EYES: Normal reaction of pupils, equal size.  Conjunctiva pink, sclera white.


NOSE: Clear with pink turbinates.


THROAT: No erythema or exudates.


NECK: No masses, no JVD, no thyroid enlargement, no adenopathy.


CHEST: No chest wall deformity.  Symmetrical expansion. 


LUNGS: Equal air entry with no crackles, wheeze, rhonchi or dullness.


CVS: Regular rate and rhythm, normal S1 and S2, no gallops, no murmurs, no rubs


ABDOMEN: Soft, nontender.  No hepatosplenomegaly, normal bowel sounds, no 

guarding or rigidity.


EXTREMITIES: No clubbing, no edema, no cyanosis, 2+ pulses and upper and lower 

extremities.


MUSCULOSKELETAL: Muscle strength and tone normal.


SPINE: No scoliosis or deformity


SKIN: No rashes.  Patient has a sacral wound in place


CENTRAL NERVOUS SYSTEM: Alert and oriented -3.  No focal deficits, tone is 

normal in all 4 extremities.


PSYCHIATRIC: Alert and oriented -3.  Appropriate affect.  Intact judgment and 

insight.











- Labs


CBC & Chem 7: 


                                 05/12/21 09:42





                                 05/12/21 07:28


Labs: 


                  Abnormal Lab Results - Last 24 Hours (Table)











  05/12/21 05/12/21 Range/Units





  07:28 09:42 


 


RBC   2.59 L  (3.80-5.40)  m/uL


 


Hgb   9.3 L D  (11.4-16.0)  gm/dL


 


Hct   29.7 L  (34.0-46.0)  %


 


MCV   114.7 H  (80.0-100.0)  fL


 


MCH   35.7 H  (25.0-35.0)  pg


 


RDW   16.0 H  (11.5-15.5)  %


 


Plt Count   61 L D  (150-450)  k/uL


 


Lymphocytes #   0.3 L  (1.0-4.8)  k/uL


 


Macrocytosis   Marked A  


 


Chloride  116 H   ()  mmol/L


 


Carbon Dioxide  16 L   (22-30)  mmol/L


 


BUN  27 H   (7-17)  mg/dL


 


Creatinine  2.35 H   (0.52-1.04)  mg/dL


 


Glucose  65 L   (74-99)  mg/dL


 


Calcium  8.1 L   (8.4-10.2)  mg/dL


 


AST  149 H   (14-36)  U/L


 


ALT  110 H   (4-34)  U/L


 


Alkaline Phosphatase  219 H   ()  U/L


 


Total Protein  4.7 L   (6.3-8.2)  g/dL


 


Albumin  2.2 L   (3.5-5.0)  g/dL








                      Microbiology - Last 24 Hours (Table)











 05/11/21 10:07 Urine Culture - Final





 Urine,Voided 


 


 05/10/21 18:46 Blood Culture - Preliminary





 Blood    No Growth after 24 hours














Assessment and Plan


Plan: 


 Assessment:





#1.  Cardiac arrest, requiring CPR, during episode of hemodialysis, with return 

of spontaneous circulation on 05/11/2021





#2.  Possible acute myocardial infarction, cardiology is following





#3.  History of ischemic cardiomyopathy and LV dysfunction





#4.  End-stage renal disease on hemodialysis





#5.  Suspect sepsis and possibly septic shock related to urinary tract infection

and sacral decubitus wound infection, currently on cefepime and vancomycin





#6.  Possible aspiration pneumonia





#7.  History of chronic A. fib maintained on Eliquis and beta blockers





#8.  History of hypothyroidism





#9.  Benign essential hypertension





#10.  Chronic anemia secondary to chronic renal disease and end-stage renal 

disease





#11.  History of dyslipidemia





#12.  History of ischemic cardiomyopathy and LV dysfunction





#13.  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





#14.  History of esophageal cancer and esophageal surgery





#15.  General medical debility





Plan:





Continue current antibiotics


Cultures have been reviewed


Her blood pressure remains marginal, and midodrine has been added


No fever or chills


No worsening shortness of breath or cough


Overall prognosis is quite guarded


We'll continue to follow and make further recommendations





I performed a history & physical examination of the patient and discussed their 

management with my nurse practitioner, Edith Funes.  I reviewed the nurse 

practitioner's note and agree with the documented findings and plan of care.  

Lung sounds are positive for diminished breath sounds with bibasilar crackles.  

The findings and the impression was discussed with the patient.  I attest to the

documentation by the nurse practitioner. 








Time with Patient: Less than 30

## 2021-05-12 NOTE — P.PN
Subjective


Progress Note Date: 05/12/21





HISTORY OF PRESENT ILLNESS:





This is an 80-year-old  female one of my patient with a previous med

ical history significant for coronary artery disease status post ST elevation 

myocardial infarction back in 02/08/2020 post PCI and bare metal stent in the 

LAD, hypertension and hypertensive cardiovascular disease, history of paroxysmal

atrial fibrillation, history of chronic kidney disease stage III, history of 

chronic tremor, history of Esophageal cancer, history of ischemic bowel post 

partial colectomy with colostomy placement, patient was recently hospitalized at

[facility] after she was admitted to the hospital was significant

edema both lower extremities and mental status changes she was found to have an 

acute kidney injury that is oliguric on top of chronic kidney disease stage 

IIIB, patient became hyperkalemic and she did not make much urine, she ended up 

starting on hemodialysis about a month ago Monday was in Friday because she 

never recovered from her ATN, beside patient was found to have an ischemic 

cardiomyopathy with ejection fraction of 10%, patient was at her dialysis center

yesterday and few minutes into the dialysis she became unresponsive she became 

quite blue and as a matter fact she arrested a CODE BLUE was called and the 

patient was started on resuscitation at the dialysis center EMS was called and 

the patient was brought into the emergency department at [facility] and

dropped to the hospital she regained consciousness, patient was never intubated,

she had a pulse, she was seen in the emergency department and she was quite 

hypotensive, a working diagnosis of septic shock was entertained due to her 

significant unstageable decubitus ulcer in the sacral area as well as urinary 

tract infection, she was started on IV anabiotic in the form of vancomycin and 

Zosyn, she was also placed on norepinephrine drip, she was supposed to be 

admitted to the intensive care unit, she was seen in consultation by ICU and 

pulmonary as well as cardiology along with infectious disease, the decision was 

made to transfer the patient to telemetry unit after taking her off the 

norepinephrine drip, I had a long conversation with her son and the patient 

regarding her poor prognosis, we recommended to continue aggressive treatment 

for now, but no resuscitation or no code is contemplated.





5/12: Patient is laying down in bed in no apparent distress today, she denies 

any chest pain, less short of breath, she has no fever or chills, she has no abd

ominal pain, nausea or vomiting, she continues to be maintained on vancomycin 

and cefepime until the final result of the cultures are back, she has been seen 

by nephrology, cardiology, pulmonary medicine, and infectious disease, the 

working diagnosis would be septic shock due to possible UTI, less likely 

infected decub ulcer, we will continue to monitor the patient very closely, her 

son was at the bedside, she did dialysis today and she tolerated it well, she 

continues to be very Cachectic and I discussed with her and her son wether or 

not she is in agreement to stop dialysis but she declined and wanted to continue

with dialysis as of now, we will restart Mirtazapine 7.5 mg po at bedtime as an 

appetite stimulants,we will continue with Nepro tid, and we will continue with 

current treatment plan, her son is asking for FMLA papers to be filled so he can

be with her, both patient and her son are aware of the poor prognosis. 





REVIEW OF SYSTEMS:





Constitutional: No documented fever, no chills, no night sweats. .  positive for

 weaknes severe weight loss,  positive for fatigue no lethargy.  No daytime 

sleepiness.


HEENT: No headache.  No blurred vision or double vision, no loss of vision.  No 

loss of Hearing, no ringing in the ears, no dizziness.  No nasal drainage or 

congestion.  No epistaxis.  No sore throat.


Lungs: positive for shortness of breath, no cough, no sputum production.  No 

wheezing.  Reports dyspnea with activity.


Cardiovascular: No chest pain, positive for  lower extremity edema.  No 

palpitations.  No paroxysmal nocturnal dyspnea.  No orthopnea.  No 

lightheadedness or dizziness.  No syncopal episodes.


Abdominal: Reports abdominal pain.  No nausea, vomiting.  No diarrhea.  No 

constipation.  No bloody or tarry stools reports loss of appetite.


Genitourinary: No dysuria, increased frequency, urgency.  No urinary retention.


Musculoskeletal: No myalgias.  positive for muscle weakness, positive for  gait 

dysfunction, no frequent falls.  positive for back pain and  neck pain.


Integumentary: posiive for sacral unstageable ulcer with significant slough.


Neurologic: No aphasia. No facial droop. No change in mentation. No head injury.

No headache. No paralysis. No paresthesia.


Psychiatric: positive for depression, anxiety .


Endocrine: significant loss of weight.  











PHYSICAL EXAMINATION:





General: This is an 80-year-old  female who is frail laying down in bed

appears to be in mild respiratory distress. 


HEENT: Head is atraumatic, normocephalic, pupils were equal round reactive to 

light and recommendation, extraocular muscle movement were intact, sclera 

nonicteric, conjunctivae were pale, mucous membranes of the mouth are somewhat 

dry.


Neck: Supple, no JVP, normal carotid upstroke bilaterally, no lymphadenopathy.


Chest: Decreased breath sounds at the bases, few rhonchi, no expiratory wheezes,

no chest wall tenderness, no intercostal retractions.


Heart: First heart sound is normal, second heart sounds normal, there is 

systolic ejection murmur 2/6 located in the left sternal border, there is 

permanent pacemaker.


Abdomen: Soft, nontender, nondistended, positive bowel sounds.  Positive for 

colostomy bag that is full with liquid stool.


Extremities: There is  +2 edema no calf tenderness DP +1 bilaterally.


Neurologic examination: Patient is awake alert and oriented X3, cranial nerves 

II-12 appear grossly intact, muscle power were 3 out of 5 in upper extremities 

and 3 out of 5 in bilateral lower extremities, deep tendon reflexes normal [place]

aterally.





ASSESSMENT AND PLAN:





1.  Cardiac arrest that required a short period of resuscitation by the dialysis

staff as well as the EMS, patient had regained consciousness immediately, the 

exact etiology is unclear, patient does have a permanent pacemaker this will be 

interrogated, patient was seen and evaluated by pulmonary, cardiology, 

nephrology, infectious disease, and it was thought that the troponin elevation 

likely related to chronic kidney disease without evidence of ischemic disease at

this time, patient is well known to have an ischemic cardiomyopathy with 

ejection fraction of 10% on a recent echocardiogram that was done less than a 

month ago, we'll continue to monitor the patient very closely.





2.  Septic shock likely related to infected decubitus unstageable sacral wound 

continue with Santyl and wet to dry dressing to the sacral area, continue IV 

antibiotic in the form of cefepime as well as vancomycin pharmacy to dose its 

peak and trough, infectious disease consultation appreciated, blood cultures 

will be obtained so far is negative, continue IV fluid at 50 mL an hour.





3.  End stage renal disease on hemodialysis Monday Wednesday and  Friday.  

Consult nephrology for hemodialysis patient did receive dialysis today.





4.  Hypertension and hypertensive cardio vascular disease currently hypotensive.

 Continue IV fluid Normal saline at 50 ml/h, we will continue with Proamatine 10

mg orally tid.





5.  Coronary artery disease status post PCI of the LAD with ischemic ca

rdiomyopathy.  we will continue with Metoprolol 25 mg orally bid.





6.  Hyperlipidemia.  Continue Lipitor 80 mg orally once every day.





7.  Paroxysmal atrial fibrillation.  Continue Franklyn was 2.5 mg orally twice every

day as well as amiodarone 200 mg orally once every day.





8.  Essential tremor.  Stable.





9.  Esophageal cancer status post esophagectomy stable.





10.  History of ischemic bowel status post colectomy with colostomy placement 

stable.





11.  Sick sinus syndrome status post permanent pacemaker placement. post pacer 

interogation.





12.  Anemia of chronic kidney disease.  Continue Aranesp 40 mg subcutaneously 

every 7 days .





13.  Orthostatic hypotension.  Continue midodrine 10 mg orally 3 times every 

day.





14. Severe protein calorie Malnutrition. we will start Nepro 1 can p tid, we sonali

l start Mirtazapine 7.5 mg po qhs.





15. Depression and Anxiety . we will continue with Sertraline 25 mg orally 

daily.





16. Unstageable sacral decubitus with slough. we will continue with Santyl 

dressing, keep turning the patient every 2hours, start Nepro and Mirtazapine.





17.  DVT prophylaxis.  Currently on Eliquis 2.5 minute gram orally twice every 

day.





18.  GI prophylaxis.  Continue Protonix 40 mg IV push every 24 hours.





19.  Disposition return to [facility].





Objective





- Vital Signs


Vital signs: 


                                   Vital Signs











Temp  97.9 F   05/12/21 12:00


 


Pulse  67   05/12/21 12:00


 


Resp  18   05/12/21 12:00


 


BP  101/51   05/12/21 14:42


 


Pulse Ox  96   05/12/21 12:00








                                 Intake & Output











 05/11/21 05/12/21 05/12/21





 18:59 06:59 18:59


 


Intake Total 24.825  0


 


Output Total 250  50


 


Balance -225.175  -50


 


Weight 44.906 kg 50 kg 


 


Intake:   


 


  Intake, IV Titration 24.825  





  Amount   


 


    Norepinephrine 32 mg In 24.825  





    Sodium Chloride 0.9% 218   





    ml @ 0.05 MCG/KG/MIN 1.   





    052 mls/hr IV .Q24H ONE   





    Rx#:[id number]   


 


  Oral   0


 


Output:   


 


  Urine 250  


 


  Stool   50


 


  Hemodialysis 0  


 


Other:   


 


  Voiding Method  Indwelling Catheter Indwelling Catheter














- Labs


CBC & Chem 7: 


                                 05/12/21 09:42





                                 05/12/21 07:28


Labs: 


                  Abnormal Lab Results - Last 24 Hours (Table)











  05/12/21 05/12/21 Range/Units





  07:28 09:42 


 


RBC   2.59 L  (3.80-5.40)  m/uL


 


Hgb   9.3 L D  (11.4-16.0)  gm/dL


 


Hct   29.7 L  (34.0-46.0)  %


 


MCV   114.7 H  (80.0-100.0)  fL


 


MCH   35.7 H  (25.0-35.0)  pg


 


RDW   16.0 H  (11.5-15.5)  %


 


Plt Count   61 L D  (150-450)  k/uL


 


Lymphocytes #   0.3 L  (1.0-4.8)  k/uL


 


Macrocytosis   Marked A  


 


Chloride  116 H   ()  mmol/L


 


Carbon Dioxide  16 L   (22-30)  mmol/L


 


BUN  27 H   (7-17)  mg/dL


 


Creatinine  2.35 H   (0.52-1.04)  mg/dL


 


Glucose  65 L   (74-99)  mg/dL


 


Calcium  8.1 L   (8.4-10.2)  mg/dL


 


AST  149 H   (14-36)  U/L


 


ALT  110 H   (4-34)  U/L


 


Alkaline Phosphatase  219 H   ()  U/L


 


Total Protein  4.7 L   (6.3-8.2)  g/dL


 


Albumin  2.2 L   (3.5-5.0)  g/dL








                      Microbiology - Last 24 Hours (Table)











 05/11/21 10:07 Urine Culture - Final





 Urine,Voided 


 


 05/10/21 18:46 Blood Culture - Preliminary





 Blood    No Growth after 24 hours

## 2021-05-12 NOTE — P.HPIM
History of Present Illness


H&P Date: 21





HISTORY OF PRESENT ILLNESS:





This is an 80-year-old  female one of my patient with a previous 

medical history significant for coronary artery disease status post ST elevation

myocardial infarction back in 2020 post PCI and bare metal stent in the 

LAD, hypertension and hypertensive cardiovascular disease, history of paroxysmal

atrial fibrillation, history of chronic kidney disease stage III, history of 

chronic tremor, history of Esophageal cancer, history of ischemic bowel post par

tial colectomy with colostomy placement, patient was recently hospitalized at 

[facility] after she was admitted to the hospital was significant

edema both lower extremities and mental status changes she was found to have an 

acute kidney injury that is oliguric on top of chronic kidney disease stage 

IIIB, patient became hyperkalemic and she did not make much urine, she ended up 

starting on hemodialysis about a month ago Monday was in Friday because she 

never recovered from her ATN, beside patient was found to have an ischemic 

cardiomyopathy with ejection fraction of 10%, patient was at her dialysis center

yesterday and few minutes into the dialysis she became unresponsive she became 

quite blue and as a matter fact she arrested a CODE BLUE was called and the 

patient was started on resuscitation at the dialysis center EMS was called and 

the patient was brought into the emergency department at [facility] and

dropped to the hospital she regained consciousness, patient was never intubated,

she had a pulse, she was seen in the emergency department and she was quite h

ypotensive, a working diagnosis of septic shock was entertained due to her 

significant unstageable decubitus ulcer in the sacral area as well as urinary 

tract infection, she was started on IV anabiotic in the form of vancomycin and 

Zosyn, she was also placed on norepinephrine drip, she was supposed to be 

admitted to the intensive care unit, she was seen in consultation by ICU and 

pulmonary as well as cardiology along with infectious disease, the decision was 

made to transfer the patient to telemetry unit after taking her off the 

norepinephrine drip, I had a long conversation with her son and the patient 

regarding her poor prognosis, we recommended to continue aggressive treatment 

for now, but no resuscitation or no code is contemplated.





REVIEW OF SYSTEMS:





Constitutional: No documented fever, no chills, no night sweats. .  No weaknes 

severe weight loss,  positive for fatigue no lethargy.  No daytime sleepiness.


EENT: No headache.  No blurred vision or double vision, no loss of vision.  No 

loss of Hearing, no ringing in the ears, no dizziness.  No nasal drainage or 

congestion.  No epistaxis.  No sore throat.


Lungs: positive for shortness of breath, no cough, no sputum production.  No 

wheezing.  Reports dyspnea with activity.


Cardiovascular: No chest pain, no lower extremity edema.  No palpitations.  No 

paroxysmal nocturnal dyspnea.  No orthopnea.  No lightheadedness or dizziness.  

No syncopal episodes.


Abdominal: Reports abdominal pain.  No nausea, vomiting.  No diarrhea.  No 

constipation.  No bloody or tarry stools reports loss of appetite.


Genitourinary: No dysuria, increased frequency, urgency.  No urinary retention.


Musculoskeletal: No myalgias.  positive for muscle weakness, positive for  gait 

dysfunction, no frequent falls.  positive for back pain and  neck pain.


Integumentary: No wounds, no lesions.  No rash or pruritus.  No unusual 

bruising.  No change in hair or nails.


Neurologic: No aphasia. No facial droop. No change in mentation. No head injury.

No headache. No paralysis. No paresthesia.


Psychiatric: No depression.  No anxiety.  No mood swings.


Endocrine: No abnormal blood sugars.  No weight change.  





PAST MEDICAL HISTORY:





End-stage renal disease on hemodialysis.  


Hypertension and hypertensive cardio vascular disease per


Hyperlipidemia 


Coronary artery disease status post ST elevation MI with PCI of the LAD 

2020


Paroxysmal atrial flutter ablation.


Ischemic cardiomyopathy


Osteoarthritis.


Esophageal cancer.


Ischemic bowel post colostomy.


Essential tremor.


Anemia of chronic disease.


sick sinus syndrome post permanent pacemaker placement








PAST SURGICAL HISTORY:





Permanent pacemaker placement 


Left heart catheter physician with PCI of the LAD 2020


Ischemic bowel status post colectomy with colostomy placement


Appendectomy.


Cholecystectomy.


Hysterectomy.


Left knee surgery.


Left foot surgery.


Bilateral wrist surgery.


Esophageal surgery.





SOCIAL HISTORY:





Patient used to smoke about 2-3 packs every day started the age of 13 and quit 

at the age of 53, she drinks occasional whiskey with honey, she denies any drugs

or abuse, currently resides at [facility].





FAMILY HISTORY: 





Father  at the age of 84 from enlarged heart and diabetes mellitus type 2, 

mother  at age 92 from old age and had diabetes mellitus type 2, patient has

4 sons alive and okay she also had 3 daughters one  from lung cancer at the 

age of 55.





PHYSICAL EXAMINATION:





General: This is an 80-year-old  female who is frail laying down in bed

appears to be in mild respiratory distress. 


HEENT: Head is atraumatic, normocephalic, pupils were equal round reactive to 

light and recommendation, extraocular muscle movement were intact, sclera 

nonicteric, conjunctivae were pale, mucous membranes of the mouth are somewhat 

dry.


Neck: Supple, no JVP, normal carotid upstroke bilaterally, no lymphadenopathy.


Chest: Decreased breath sounds at the bases, few rhonchi, no expiratory wheezes,

no chest wall tenderness, no intercostal retractions.


Heart: First heart sound is normal, second heart sounds normal, there is 

systolic ejection murmur 2/6 located in the left sternal border, there is 

permanent pacemaker.


Abdomen: Soft, nontender, nondistended, positive bowel sounds.  Positive for 

colostomy bag that is full with liquid stool.


Extremities: There is  trace  edema no calf tenderness DP +1 bilaterally.


Neurologic examination: Patient is awake alert and oriented X3, cranial nerves 

II-12 appear grossly intact, muscle power were 3 out of 5 in upper extremities 

and 3 out of 5 in bilateral lower extremities, deep tendon reflexes normal bila

terally.





ASSESSMENT AND PLAN:





1.  Cardiac arrest that required a short period of resuscitation by the dialysis

staff as well as the EMS, patient had regained consciousness immediately, the 

exact etiology is unclear, patient does have a permanent pacemaker this will be 

interrogated, patient was seen and evaluated by pulmonary, cardiology, 

nephrology, infectious disease, and it was thought that the troponin elevation 

likely related to chronic kidney disease without evidence of ischemic disease at

this time, patient is well known to have an ischemic cardiomyopathy with 

ejection fraction of 10% on a recent echocardiogram that was done less than a 

month ago, we'll continue to monitor the patient very closely.





2.  Septic shock likely related to urinary tract infection doubt decubitus ulcer

that does not appear to be infected continue with Santyl and wet to dry dressing

to the sacral area, continue IV antibiotic in the form of cefepime as well as 

vancomycin pharmacy to dose its peak and trough, infectious disease consultation

appreciated, blood cultures will be obtained, try to wean the patient off 

norepinephrine drip, continue IV fluid at 50 mL an hour.





3.  End stage renal disease on hemodialysis  and  Friday.  

Consult nephrology for hemodialysis patient did not receive any dialysis today, 

she will receive one today and one tomorrow morning, we will follow the patient 

potassium level as well as carbon dioxide level.





4.  Hypertension and hypertensive cardio vascular disease currently hypotensive.

 Continue IV fluid, continue norepinephrine drip.





5.  Coronary artery disease status post PCI of the LAD with ischemic 

cardiomyopathy.  Cardiology is following.  Hold off beta blocker due to the 

hypertension.





6.  Hyperlipidemia.  Continue Lipitor 80 mg orally once every day.





7.  Paroxysmal atrial fibrillation.  Continue Franklyn was 2.5 mg orally twice every

day as well as amiodarone 200 mg orally once every day.





8.  Essential tremor.  Stable.





9.  Esophageal cancer status post esophagectomy stable.





10.  History of ischemic bowel status post colectomy with colostomy placement 

stable.





11.  Sick sinus syndrome status post permanent pacemaker placement.





12.  Anemia of chronic kidney disease.  Continue Aranesp 40 mg subcutaneously 

every 7 days .





13.  Orthostatic hypotension.  Continue midodrine 10 mg orally 3 times every 

day.





14.  Admits to inpatient.  Estimate a length of stay 2 midnights.





15.  Patient is no code.





16.  DVT prophylaxis.  Currently on Eliquis 2.5 minute gram orally twice every 

day.





17.  GI prophylaxis.  Continue Protonix 40 mg IV push every 24 hours.





18.  Disposition return to [facility].





Past Medical History


Past Medical History: Atrial Fibrillation, Coronary Artery Disease (CAD), Cance

r, Chest Pain / Angina, Hypertension, Myocardial Infarction (MI), Osteoarthritis

(OA), Thyroid Disorder


Additional Past Medical History / Comment(s): " LEAKY VALVES", ESOPHAGEAL 

CANCER,  DECREASED KIDNEY FUNCTION, CKD, ischemic bowel, Raynauds, Cardiac cath 

with stents.


Last Myocardial Infarction Date:: 20


History of Any Multi-Drug Resistant Organisms: None Reported


Past Surgical History: Appendectomy, Cholecystectomy, Heart Catheterization, 

Heart Catheterization With Stent, Hysterectomy


Additional Past Surgical History / Comment(s): ESOPHAGUS SURGERY, FOOT SURGERY, 

LEFT KNEE SURGERY, PARATHYROID SURGERY, BILATERAL WRIST, colectory with ostomy 

placement.


Past Anesthesia/Blood Transfusion Reactions: No Reported Reaction


Date of Last Stent Placement:: 2020


Past Psychological History: No Psychological Hx Reported


Past Alcohol Use History: Rare


Additional Past Alcohol Use History / Comment(s): STARTED SMOKING AT AGE 13 QUIT

SMOKING AT AGE 53 SMOKED 2-3PPD


Past Drug Use History: None Reported





- Past Family History


  ** Mother


Family Medical History: Cancer (Kidney)


Additional Family Medical History / Comment(s): renal cancer





  ** Father


Family Medical History: Cancer (Mouth cancer at 84)


Additional Family Medical History / Comment(s): Mouth cancer





  ** Daughter(s)


Family Medical History: Cancer (Lung cancer)





  ** Brother(s)


Family Medical History: No Reported History





  ** Sister(s)


Family Medical History: No Reported History





Medications and Allergies


                                Home Medications











 Medication  Instructions  Recorded  Confirmed  Type


 


Apixaban [Eliquis] 2.5 mg PO BID@0800,1700 02/08/20 05/10/21 History


 


ALPRAZolam [Xanax] 0.25 mg PO BID PRN 05/10/21 05/10/21 History


 


Acetaminophen Tab [Tylenol] 1,000 mg PO TID PRN 05/10/21 05/10/21 History


 


Amiodarone HCl [Pacerone] 200 mg PO DAILY@0800 05/10/21 05/10/21 History


 


Atorvastatin [Lipitor] 80 mg PO HS@2100 05/10/21 05/10/21 History


 


Collagenase [Santyl] 1 applic TOPICAL DAILY 05/10/21 05/10/21 History


 


Ipratropium-Albuterol Nebulize 3 ml PO QID@08,12,17,21 05/10/21 05/10/21 History





[Duoneb 0.5 mg-3 mg/3 ml Soln]    


 


Liquacel (Unknown Strength) 30 ml PO BID@0800,1200 05/10/21 05/10/21 History


 


Melatonin 1 mg PO HS@2100 05/10/21 05/10/21 History


 


Metoclopramide HCl [Reglan] 5 mg PO TID PRN 05/10/21 05/10/21 History


 


Metoprolol Tartrate [Lopressor] 25 mg PO BID@0800,1700 05/10/21 05/10/21 History


 


Midodrine HCl [ProAmatine] 10 mg PO TID@0800,1200,1700 05/10/21 05/10/21 History


 


Mirtazapine 7.5 mg PO HS@2100 05/10/21 05/10/21 History


 


Nepro 1 can PO TID@0800,1200,2100 05/10/21 05/10/21 History


 


Pantoprazole [Protonix] 40 mg PO DAILY@0600 05/10/21 05/10/21 History


 


Sertraline [Zoloft] 25 mg PO DAILY@0800 05/10/21 05/10/21 History


 


bisacodyL [Dulcolax] 10 mg RECTAL DAILY PRN 05/10/21 05/10/21 History


 


oxyCODONE HCL 5 mg PO TID PRN 05/10/21 05/10/21 History








                                    Allergies











Allergy/AdvReac Type Severity Reaction Status Date / Time


 


ciprofloxacin [From Cipro] Allergy  Nausea & Verified 05/10/21 21:29





   Vomiting  


 


Penicillins Allergy  Anaphylaxis Verified 05/10/21 21:29


 


venom-honey bee Allergy  Anaphylaxis Verified 05/10/21 21:29





[bee venom (honey bee)]     


 


venom-wasp [wasp venom] Allergy  Anaphylaxis Verified 05/10/21 21:29














Physical Exam


Vitals: 


                                   Vital Signs











  Temp Pulse Resp BP Pulse Ox


 


 21 11:46   69  18  111/53  97


 


 21 10:39   67  16  109/63  96


 


 21 09:57   70  18  127/56  96


 


 21 09:36   69  18  107/47  96


 


 21 08:41   67  16  120/47  96


 


 21 07:26   67  18  105/43  96


 


 21 07:17   66  16  95/52  98


 


 21 06:32   66  16  102/45  95


 


 21 05:22     115/65 


 


 21 04:33  97.8 F    


 


 21 04:29   67  17  108/63  95


 


 21 02:31   70  16  115/40  95


 


 21 01:04   71  16  105/45  93 L


 


 05/10/21 22:10   71  16  114/44  98


 


 05/10/21 20:04   68  18  110/44  100


 


 05/10/21 19:54     72/46 


 


 05/10/21 19:27   67  16  116/75  100


 


 05/10/21 19:05   72  18  65/27  100


 


 05/10/21 18:56  97.5 F L  76  18  74/58  99








                                Intake and Output











 05/10/21 05/11/21 05/11/21





 22:59 06:59 14:59


 


Intake Total 0.880 9.98 21.005


 


Output Total   200


 


Balance 0.880 9.98 -178.995


 


Intake:   


 


  Intake, IV Titration 0.880 9.98 21.005





  Amount   


 


    Norepinephrine 32 mg In 0.880 9.98 21.005





    Sodium Chloride 0.9% 218   





    ml @ 0.05 MCG/KG/MIN 1.   





    052 mls/hr IV .Q24H ONE   





    Rx#:[id number]   


 


Output:   


 


  Urine   200


 


Other:   


 


  Weight 44.906 kg  44.906 kg














Results


CBC & Chem 7: 


                                 21 09:42





                                 21 07:28


Labs: 


                  Abnormal Lab Results - Last 24 Hours (Table)











  05/10/21 05/10/21 05/10/21 Range/Units





  18:46 18:46 18:46 


 


WBC  15.1 H    (3.8-10.6)  k/uL


 


RBC  3.55 L    (3.80-5.40)  m/uL


 


MCV  110.3 H    (80.0-100.0)  fL


 


MCHC  30.7 L    (31.0-37.0)  g/dL


 


RDW  16.3 H    (11.5-15.5)  %


 


Neutrophils #  14.2 H    (1.3-7.7)  k/uL


 


Lymphocytes #  0.5 L    (1.0-4.8)  k/uL


 


Macrocytosis  Marked A    


 


PT    13.2 H  (9.0-12.0)  sec


 


INR    1.3 H  (<1.2)  


 


APTT    >200.0 H*  (22.0-30.0)  sec


 


VBG pH     (7.31-7.41)  


 


VBG HCO3     (24-28)  mmol/L


 


Chloride     ()  mmol/L


 


Carbon Dioxide   18 L   (22-30)  mmol/L


 


BUN   47 H   (7-17)  mg/dL


 


Creatinine   3.53 H   (0.52-1.04)  mg/dL


 


Plasma Lactic Acid Adair     (0.7-2.0)  mmol/L


 


Calcium     (8.4-10.2)  mg/dL


 


Ionized Calcium Ariane   4.3 L   (4.5-5.3)  mg/dL


 


Total Bilirubin   1.7 H   (0.2-1.3)  mg/dL


 


AST   475 H   (14-36)  U/L


 


ALT   136 H   (4-34)  U/L


 


Alkaline Phosphatase   389 H   ()  U/L


 


Troponin I     (0.000-0.034)  ng/mL


 


Total Protein     (6.3-8.2)  g/dL


 


Albumin   3.2 L   (3.5-5.0)  g/dL


 


Urine Appearance     (Clear)  


 


Urine Protein     (Negative)  


 


Urine Blood     (Negative)  


 


Ur Leukocyte Esterase     (Negative)  


 


Urine RBC     (0-5)  /hpf


 


Urine WBC     (0-5)  /hpf


 


Urine WBC Clumps     (None)  /hpf


 


Amorphous Sediment     (None)  /hpf


 


Urine Bacteria     (None)  /hpf


 


Hyaline Casts     (0-2)  /lpf


 


Urine Mucus     (None)  /hpf














  05/10/21 05/10/21 05/10/21 Range/Units





  18:46 18:46 18:46 


 


WBC     (3.8-10.6)  k/uL


 


RBC     (3.80-5.40)  m/uL


 


MCV     (80.0-100.0)  fL


 


MCHC     (31.0-37.0)  g/dL


 


RDW     (11.5-15.5)  %


 


Neutrophils #     (1.3-7.7)  k/uL


 


Lymphocytes #     (1.0-4.8)  k/uL


 


Macrocytosis     


 


PT     (9.0-12.0)  sec


 


INR     (<1.2)  


 


APTT     (22.0-30.0)  sec


 


VBG pH    7.28 L  (7.31-7.41)  


 


VBG HCO3    19 L  (24-28)  mmol/L


 


Chloride     ()  mmol/L


 


Carbon Dioxide     (22-30)  mmol/L


 


BUN     (7-17)  mg/dL


 


Creatinine     (0.52-1.04)  mg/dL


 


Plasma Lactic Acid Adair  4.7 H*    (0.7-2.0)  mmol/L


 


Calcium     (8.4-10.2)  mg/dL


 


Ionized Calcium Ariane     (4.5-5.3)  mg/dL


 


Total Bilirubin     (0.2-1.3)  mg/dL


 


AST     (14-36)  U/L


 


ALT     (4-34)  U/L


 


Alkaline Phosphatase     ()  U/L


 


Troponin I   0.050 H*   (0.000-0.034)  ng/mL


 


Total Protein     (6.3-8.2)  g/dL


 


Albumin     (3.5-5.0)  g/dL


 


Urine Appearance     (Clear)  


 


Urine Protein     (Negative)  


 


Urine Blood     (Negative)  


 


Ur Leukocyte Esterase     (Negative)  


 


Urine RBC     (0-5)  /hpf


 


Urine WBC     (0-5)  /hpf


 


Urine WBC Clumps     (None)  /hpf


 


Amorphous Sediment     (None)  /hpf


 


Urine Bacteria     (None)  /hpf


 


Hyaline Casts     (0-2)  /lpf


 


Urine Mucus     (None)  /hpf














  05/10/21 05/11/21 05/11/21 Range/Units





  21:37 02:06 05:47 


 


WBC     (3.8-10.6)  k/uL


 


RBC     (3.80-5.40)  m/uL


 


MCV     (80.0-100.0)  fL


 


MCHC     (31.0-37.0)  g/dL


 


RDW     (11.5-15.5)  %


 


Neutrophils #     (1.3-7.7)  k/uL


 


Lymphocytes #     (1.0-4.8)  k/uL


 


Macrocytosis     


 


PT     (9.0-12.0)  sec


 


INR     (<1.2)  


 


APTT     (22.0-30.0)  sec


 


VBG pH     (7.31-7.41)  


 


VBG HCO3     (24-28)  mmol/L


 


Chloride     ()  mmol/L


 


Carbon Dioxide     (22-30)  mmol/L


 


BUN     (7-17)  mg/dL


 


Creatinine     (0.52-1.04)  mg/dL


 


Plasma Lactic Acid Adair  2.1 H*    (0.7-2.0)  mmol/L


 


Calcium     (8.4-10.2)  mg/dL


 


Ionized Calcium Ariane     (4.5-5.3)  mg/dL


 


Total Bilirubin     (0.2-1.3)  mg/dL


 


AST     (14-36)  U/L


 


ALT     (4-34)  U/L


 


Alkaline Phosphatase     ()  U/L


 


Troponin I   0.063 H*  0.066 H*  (0.000-0.034)  ng/mL


 


Total Protein     (6.3-8.2)  g/dL


 


Albumin     (3.5-5.0)  g/dL


 


Urine Appearance     (Clear)  


 


Urine Protein     (Negative)  


 


Urine Blood     (Negative)  


 


Ur Leukocyte Esterase     (Negative)  


 


Urine RBC     (0-5)  /hpf


 


Urine WBC     (0-5)  /hpf


 


Urine WBC Clumps     (None)  /hpf


 


Amorphous Sediment     (None)  /hpf


 


Urine Bacteria     (None)  /hpf


 


Hyaline Casts     (0-2)  /lpf


 


Urine Mucus     (None)  /hpf














  21 Range/Units





  08:13 08:13 10:07 


 


WBC   13.4 H   (3.8-10.6)  k/uL


 


RBC   3.41 L   (3.80-5.40)  m/uL


 


MCV   113.4 H   (80.0-100.0)  fL


 


MCHC   29.6 L   (31.0-37.0)  g/dL


 


RDW   16.2 H   (11.5-15.5)  %


 


Neutrophils #   12.1 H   (1.3-7.7)  k/uL


 


Lymphocytes #   0.6 L   (1.0-4.8)  k/uL


 


Macrocytosis   Marked A   


 


PT     (9.0-12.0)  sec


 


INR     (<1.2)  


 


APTT     (22.0-30.0)  sec


 


VBG pH     (7.31-7.41)  


 


VBG HCO3     (24-28)  mmol/L


 


Chloride  108 H    ()  mmol/L


 


Carbon Dioxide  18 L    (22-30)  mmol/L


 


BUN  52 H    (7-17)  mg/dL


 


Creatinine  3.76 H    (0.52-1.04)  mg/dL


 


Plasma Lactic Acid Adair     (0.7-2.0)  mmol/L


 


Calcium  7.8 L    (8.4-10.2)  mg/dL


 


Ionized Calcium Ariane     (4.5-5.3)  mg/dL


 


Total Bilirubin     (0.2-1.3)  mg/dL


 


AST  307 H    (14-36)  U/L


 


ALT  152 H    (4-34)  U/L


 


Alkaline Phosphatase  298 H    ()  U/L


 


Troponin I     (0.000-0.034)  ng/mL


 


Total Protein  5.4 L    (6.3-8.2)  g/dL


 


Albumin  2.6 L    (3.5-5.0)  g/dL


 


Urine Appearance    Turbid H  (Clear)  


 


Urine Protein    2+ H  (Negative)  


 


Urine Blood    Moderate H  (Negative)  


 


Ur Leukocyte Esterase    Large H  (Negative)  


 


Urine RBC    >182 H  (0-5)  /hpf


 


Urine WBC    >182 H  (0-5)  /hpf


 


Urine WBC Clumps    Many H  (None)  /hpf


 


Amorphous Sediment    Moderate H  (None)  /hpf


 


Urine Bacteria    Many H  (None)  /hpf


 


Hyaline Casts    251 H  (0-2)  /lpf


 


Urine Mucus    Many H  (None)  /hpf








                      Microbiology - Last 24 Hours (Table)











 05/10/21 19:52 Gram Stain - Preliminary





 Other - Other Wound Culture - Preliminary


 


 05/10/21 19:52 Anaerobic Culture - Preliminary





 Other - Other 














Thrombosis Risk Factor Assmnt





- Choose All That Apply


Any of the Below Risk Factors Present?: Yes


Each Factor Represents 1 point: Medical pt on bed rest


Other Risk Factors: Yes


Each Risk Factor Represents 2 Points: Patient confined to bed, Malignancy


Each Risk Factor Represents 3 Points: Age 75 years or older


Other congenital or acquired thrombophilia - If yes, enter type in comment: No


Thrombosis Risk Factor Assessment Total Risk Factor Score: 8


Thrombosis Risk Factor Assessment Level: High Risk

## 2021-05-12 NOTE — XR
EXAMINATION TYPE: XR chest 1V portable

 

DATE OF EXAM: 5/12/2021

 

HISTORY: Shortness of breath.

 

COMPARISON: 5/11/2021

 

TECHNIQUE: Single view of the chest is submitted.

 

FINDINGS:

Demonstrated are scattered senescent parenchymal change.  

 

Airspace infiltrate throughout the right lung persists and is essentially unchanged. The left lung is
 clear.

 

The heart is stable.

 

Hilar and mediastinal structures are within normal limits.  

 

Degenerative changes are seen of the dorsal spine. 

 

 IMPRESSION: 

 

1.  Airspace infiltrate throughout the right lung persists and is essentially unchanged.

## 2021-05-12 NOTE — PN
PROGRESS NOTE



DATE OF SERVICE:

05/12/2021



REASON FOR FOLLOWUP:

1. Urinary tract infection.

2. Sacral pressure ulcer.



INTERVAL HISTORY:

The patient is currently afebrile.  Patient is slightly more awake and alert.  She is

breathing comfortably on nasal cannula oxygen.  Denies any chest pain, cough, abdominal

pain, still having pain in the sacral wound area, but no worsening.



PHYSICAL EXAMINATION:

Blood pressure 131/63, pulse of 70, temperature 98.1.  She is 99% on 5 L nasal cannula.

General description: The patient is an elderly female lying in bed in no distress.

Respiratory system: Unlabored breathing, clear to auscultation anteriorly.

Heart S1, S2.  Regular rate and rhythm.

ABDOMEN:  Soft, no tenderness.

EXTREMITIES:  No edema of the feet.



LABS:

Hemoglobin 9.3, white count of 4.8.  BUN of 27, creatinine is 2.35.



DIAGNOSTIC IMPRESSION AND PLAN:

1. Patient admitted to the hospital with an episode of unresponsiveness and

    resuscitation.  Did have elevated white count.  Concern for possible urinary tract

    infection versus line sepsis.  Culture so far negative.  Patient is covered with

    vancomycin and cefepime to continue.

2. Patient has sacral pressure ulcer.  Local wound care to continue with Santyl and

    monitor clinical course closely.





MMODL / IJN: [id number] / Job#: [id number]

## 2021-05-13 LAB
ALBUMIN SERPL-MCNC: 2.1 G/DL (ref 3.5–5)
ALP SERPL-CCNC: 200 U/L (ref 38–126)
ALT SERPL-CCNC: 78 U/L (ref 4–34)
ANION GAP SERPL CALC-SCNC: 5 MMOL/L
AST SERPL-CCNC: 96 U/L (ref 14–36)
BASOPHILS # BLD AUTO: 0 K/UL (ref 0–0.2)
BASOPHILS NFR BLD AUTO: 0 %
BUN SERPL-SCNC: 12 MG/DL (ref 7–17)
CALCIUM SPEC-MCNC: 8 MG/DL (ref 8.4–10.2)
CHLORIDE SERPL-SCNC: 117 MMOL/L (ref 98–107)
CO2 SERPL-SCNC: 21 MMOL/L (ref 22–30)
EOSINOPHIL # BLD AUTO: 0.1 K/UL (ref 0–0.7)
EOSINOPHIL NFR BLD AUTO: 1 %
ERYTHROCYTE [DISTWIDTH] IN BLOOD BY AUTOMATED COUNT: 2.76 M/UL (ref 3.8–5.4)
ERYTHROCYTE [DISTWIDTH] IN BLOOD: 16 % (ref 11.5–15.5)
FERRITIN SERPL-MCNC: 2902.9 NG/ML (ref 10–291)
GLUCOSE SERPL-MCNC: 106 MG/DL (ref 74–99)
HCT VFR BLD AUTO: 31.5 % (ref 34–46)
HGB BLD-MCNC: 9.7 GM/DL (ref 11.4–16)
IRON SERPL-MCNC: 33 UG/DL (ref 50–170)
LYMPHOCYTES # SPEC AUTO: 0.3 K/UL (ref 1–4.8)
LYMPHOCYTES NFR SPEC AUTO: 5 %
MCH RBC QN AUTO: 35.2 PG (ref 25–35)
MCHC RBC AUTO-ENTMCNC: 30.8 G/DL (ref 31–37)
MCV RBC AUTO: 114.2 FL (ref 80–100)
MONOCYTES # BLD AUTO: 0.3 K/UL (ref 0–1)
MONOCYTES NFR BLD AUTO: 5 %
NEUTROPHILS # BLD AUTO: 5.5 K/UL (ref 1.3–7.7)
NEUTROPHILS NFR BLD AUTO: 88 %
PLATELET # BLD AUTO: 81 K/UL (ref 150–450)
POTASSIUM SERPL-SCNC: 4 MMOL/L (ref 3.5–5.1)
PROT SERPL-MCNC: 4.8 G/DL (ref 6.3–8.2)
SODIUM SERPL-SCNC: 143 MMOL/L (ref 137–145)
TIBC SERPL-MCNC: 182 UG/DL (ref 228–460)
WBC # BLD AUTO: 6.3 K/UL (ref 3.8–10.6)

## 2021-05-13 RX ADMIN — Medication SCH MG: at 16:23

## 2021-05-13 RX ADMIN — Medication SCH MG: at 20:27

## 2021-05-13 RX ADMIN — PANTOPRAZOLE SODIUM SCH MG: 40 TABLET, DELAYED RELEASE ORAL at 06:22

## 2021-05-13 RX ADMIN — ATORVASTATIN CALCIUM SCH MG: 80 TABLET, FILM COATED ORAL at 20:26

## 2021-05-13 RX ADMIN — MORPHINE SULFATE PRN MG: 2 INJECTION, SOLUTION INTRAMUSCULAR; INTRAVENOUS at 14:12

## 2021-05-13 RX ADMIN — CEFEPIME HYDROCHLORIDE SCH MLS/HR: 1 INJECTION, POWDER, FOR SOLUTION INTRAMUSCULAR; INTRAVENOUS at 20:27

## 2021-05-13 RX ADMIN — Medication SCH MG: at 08:31

## 2021-05-13 RX ADMIN — CEFAZOLIN SCH MLS/HR: 330 INJECTION, POWDER, FOR SOLUTION INTRAMUSCULAR; INTRAVENOUS at 08:38

## 2021-05-13 RX ADMIN — COLLAGENASE SANTYL SCH APPLIC: 250 OINTMENT TOPICAL at 11:41

## 2021-05-13 RX ADMIN — AMIODARONE HYDROCHLORIDE SCH MG: 200 TABLET ORAL at 08:31

## 2021-05-13 RX ADMIN — CEFAZOLIN SCH: 330 INJECTION, POWDER, FOR SOLUTION INTRAMUSCULAR; INTRAVENOUS at 22:29

## 2021-05-13 RX ADMIN — CEFAZOLIN SCH: 330 INJECTION, POWDER, FOR SOLUTION INTRAMUSCULAR; INTRAVENOUS at 10:55

## 2021-05-13 RX ADMIN — MIDODRINE HYDROCHLORIDE SCH MG: 5 TABLET ORAL at 11:36

## 2021-05-13 RX ADMIN — Medication SCH MG: at 20:26

## 2021-05-13 RX ADMIN — MIDODRINE HYDROCHLORIDE SCH MG: 5 TABLET ORAL at 08:31

## 2021-05-13 RX ADMIN — APIXABAN SCH MG: 2.5 TABLET, FILM COATED ORAL at 08:32

## 2021-05-13 RX ADMIN — SERTRALINE HYDROCHLORIDE SCH MG: 25 TABLET ORAL at 08:32

## 2021-05-13 RX ADMIN — APIXABAN SCH MG: 2.5 TABLET, FILM COATED ORAL at 16:23

## 2021-05-13 RX ADMIN — MIRTAZAPINE SCH MG: 15 TABLET, FILM COATED ORAL at 20:26

## 2021-05-13 RX ADMIN — MIDODRINE HYDROCHLORIDE SCH MG: 5 TABLET ORAL at 16:23

## 2021-05-13 NOTE — P.PN
Subjective





Patient is seen in follow-up for acute kidney injury, hemodialysis dependent.  

She is maintained on hemodialysis on Monday Wednesday Friday schedule.  Blood 

pressure stable.  On 5 L nasal cannula.  Quite weak.  





Vital signs are stable.


General: The patient appeared well nourished and normally developed. 


HEENT: Head exam is unremarkable. Neck is without jugular venous distension.


LUNGS: Breath sounds decreased.


HEART: Rate and Rhythm are regular.


ABDOMEN: Soft, no distention.


EXTREMITITES: No edema.





Objective





- Vital Signs


Vital signs: 


                                   Vital Signs











Temp  97.4 F L  05/13/21 08:25


 


Pulse  77   05/13/21 08:25


 


Resp  16   05/13/21 08:25


 


BP  117/50   05/13/21 08:25


 


Pulse Ox  99   05/13/21 08:25








                                 Intake & Output











 05/12/21 05/13/21 05/13/21





 18:59 06:59 18:59


 


Intake Total 0  0


 


Output Total 50 0 


 


Balance -50 0 0


 


Weight  50 kg 


 


Intake:   


 


  Oral 0  0


 


Output:   


 


  Urine  0 


 


  Stool 50  


 


  Hemodialysis 0  


 


Other:   


 


  Voiding Method Indwelling Catheter Indwelling Catheter Indwelling Catheter


 


  # Voids  0 


 


  # Bowel Movements  1 














- Labs


CBC & Chem 7: 


                                 05/13/21 06:34





                                 05/13/21 06:34


Labs: 


                  Abnormal Lab Results - Last 24 Hours (Table)











  05/12/21 05/12/21 05/13/21 Range/Units





  07:28 09:42 06:34 


 


RBC    2.76 L  (3.80-5.40)  m/uL


 


Hgb    9.7 L  (11.4-16.0)  gm/dL


 


Hct    31.5 L  (34.0-46.0)  %


 


MCV    114.2 H  (80.0-100.0)  fL


 


MCH    35.2 H  (25.0-35.0)  pg


 


MCHC    30.8 L  (31.0-37.0)  g/dL


 


RDW    16.0 H  (11.5-15.5)  %


 


Plt Count   61 L D  81 L  (150-450)  k/uL


 


Lymphocytes #   0.3 L  0.3 L  (1.0-4.8)  k/uL


 


Macrocytosis    Marked A  


 


Chloride     ()  mmol/L


 


Carbon Dioxide     (22-30)  mmol/L


 


Creatinine     (0.52-1.04)  mg/dL


 


Glucose     (74-99)  mg/dL


 


Calcium     (8.4-10.2)  mg/dL


 


Iron  33 L    ()  ug/dL


 


TIBC  182 L    (228-460)  ug/dL


 


Ferritin  2902.9 H    (10.0-291.0)  ng/mL


 


AST     (14-36)  U/L


 


ALT     (4-34)  U/L


 


Alkaline Phosphatase     ()  U/L


 


Total Protein     (6.3-8.2)  g/dL


 


Albumin     (3.5-5.0)  g/dL














  05/13/21 Range/Units





  06:34 


 


RBC   (3.80-5.40)  m/uL


 


Hgb   (11.4-16.0)  gm/dL


 


Hct   (34.0-46.0)  %


 


MCV   (80.0-100.0)  fL


 


MCH   (25.0-35.0)  pg


 


MCHC   (31.0-37.0)  g/dL


 


RDW   (11.5-15.5)  %


 


Plt Count   (150-450)  k/uL


 


Lymphocytes #   (1.0-4.8)  k/uL


 


Macrocytosis   


 


Chloride  117 H  ()  mmol/L


 


Carbon Dioxide  21 L  (22-30)  mmol/L


 


Creatinine  1.47 H  (0.52-1.04)  mg/dL


 


Glucose  106 H  (74-99)  mg/dL


 


Calcium  8.0 L  (8.4-10.2)  mg/dL


 


Iron   ()  ug/dL


 


TIBC   (228-460)  ug/dL


 


Ferritin   (10.0-291.0)  ng/mL


 


AST  96 H  (14-36)  U/L


 


ALT  78 H  (4-34)  U/L


 


Alkaline Phosphatase  200 H  ()  U/L


 


Total Protein  4.8 L  (6.3-8.2)  g/dL


 


Albumin  2.1 L  (3.5-5.0)  g/dL








                      Microbiology - Last 24 Hours (Table)











 05/10/21 18:46 Blood Culture - Preliminary





 Blood    No Growth after 48 hours


 


 05/10/21 19:52 Gram Stain - Preliminary





 Other - Other Wound Culture - Preliminary





    Presumptive Staph aureus


 


 05/11/21 10:07 Urine Culture - Final





 Urine,Voided 














Assessment and Plan


Plan: 





Assessment:


1.  Acute kidney injury, currently hemodialysis dependent.  She is maintained on

hemodialysis on Monday Wednesday Friday schedule outpatient.  She has a groin 

femoral catheter.


2.  Status post cardiac arrest.


3.  Metabolic acidosis secondary to acute kidney injury.  Improved postdialysis.

 Also on oral bicarb.


4.  UTI versus line sepsis.  On antibiotics.  Infectious disease following.  

Also has a sacral wound with wound culture positive for staph.


5.  Shock s/p Levophed.


6.  Anemia of chronic kidney disease.  On Aranesp.  High ferritin level noted.





Plan:


Hemodialysis tomorrow.


Maintain midodrine.


Phosphorus normal.


Discussed with the patient and her son regarding long-term care.  Patient 

becomes quite hypotensive during dialysis and has difficulty with 

ultrafiltration.


Comfort measures were discussed.  Continue with dialysis for now. 


Overall prognosis guarded.

## 2021-05-13 NOTE — P.PN
Subjective


Progress Note Date: 05/13/21





HISTORY OF PRESENT ILLNESS:





This is an 80-year-old  female one of my patient with a previous med

ical history significant for coronary artery disease status post ST elevation 

myocardial infarction back in 02/08/2020 post PCI and bare metal stent in the 

LAD, hypertension and hypertensive cardiovascular disease, history of paroxysmal

atrial fibrillation, history of chronic kidney disease stage III, history of 

chronic tremor, history of Esophageal cancer, history of ischemic bowel post 

partial colectomy with colostomy placement, patient was recently hospitalized at

[facility] after she was admitted to the hospital was significant

edema both lower extremities and mental status changes she was found to have an 

acute kidney injury that is oliguric on top of chronic kidney disease stage 

IIIB, patient became hyperkalemic and she did not make much urine, she ended up 

starting on hemodialysis about a month ago Monday was in Friday because she 

never recovered from her ATN, beside patient was found to have an ischemic 

cardiomyopathy with ejection fraction of 10%, patient was at her dialysis center

yesterday and few minutes into the dialysis she became unresponsive she became 

quite blue and as a matter fact she arrested a CODE BLUE was called and the 

patient was started on resuscitation at the dialysis center EMS was called and 

the patient was brought into the emergency department at [facility] and

dropped to the hospital she regained consciousness, patient was never intubated,

she had a pulse, she was seen in the emergency department and she was quite 

hypotensive, a working diagnosis of septic shock was entertained due to her 

significant unstageable decubitus ulcer in the sacral area as well as urinary 

tract infection, she was started on IV anabiotic in the form of vancomycin and 

Zosyn, she was also placed on norepinephrine drip, she was supposed to be 

admitted to the intensive care unit, she was seen in consultation by ICU and 

pulmonary as well as cardiology along with infectious disease, the decision was 

made to transfer the patient to telemetry unit after taking her off the 

norepinephrine drip, I had a long conversation with her son and the patient 

regarding her poor prognosis, we recommended to continue aggressive treatment 

for now, but no resuscitation or no code is contemplated.





5/12: Patient is laying down in bed in no apparent distress today, she denies 

any chest pain, less short of breath, she has no fever or chills, she has no abd

ominal pain, nausea or vomiting, she continues to be maintained on vancomycin 

and cefepime until the final result of the cultures are back, she has been seen 

by nephrology, cardiology, pulmonary medicine, and infectious disease, the 

working diagnosis would be septic shock due to possible UTI, less likely 

infected decub ulcer, we will continue to monitor the patient very closely, her 

son was at the bedside, she did dialysis today and she tolerated it well, she 

continues to be very Cachectic and I discussed with her and her son wether or 

not she is in agreement to stop dialysis but she declined and wanted to continue

with dialysis as of now, we will restart Mirtazapine 7.5 mg po at bedtime as an 

appetite stimulants,we will continue with Nepro tid, and we will continue with 

current treatment plan, her son is asking for FMLA papers to be filled so he can

be with her, both patient and her son are aware of the poor prognosis. 





5/13: patient is laying down in bed she appears very weak today, she is feeling 

better according to her, she denies any chest pain, less short of breath, she 

has been tolerating her treatment very well, she wanted to continue with 

hemodialysis for now, she has no abdominal pain, her stoma is working well, she 

has pain all over her body, she does hurt in her unstageable ulcer as well as 

both feet they were put in waffled boots, we will continue current treatment 

plan and the plan is to send back to [facility]  with therapy.





REVIEW OF SYSTEMS:





Constitutional: No documented fever, no chills, no night sweats. .  positive for

 weaknes severe weight loss,  positive for fatigue no lethargy.  No daytime 

sleepiness.


HEENT: No headache.  No blurred vision or double vision, no loss of vision.  No 

loss of Hearing, no ringing in the ears, no dizziness.  No nasal drainage or 

congestion.  No epistaxis.  No sore throat.


Lungs: positive for shortness of breath, no cough, no sputum production.  No 

wheezing.  Reports dyspnea with activity.


Cardiovascular: No chest pain, positive for  lower extremity edema.  No 

palpitations.  No paroxysmal nocturnal dyspnea.  No orthopnea.  No 

lightheadedness or dizziness.  No syncopal episodes.


Abdominal: Reports abdominal pain.  No nausea, vomiting.  No diarrhea.  No 

constipation.  No bloody or tarry stools reports loss of appetite.


Genitourinary: No dysuria, increased frequency, urgency.  No urinary retention.


Musculoskeletal: No myalgias.  positive for muscle weakness, positive for  gait 

dysfunction, no frequent falls.  positive for back pain and  neck pain.


Integumentary: posiive for sacral unstageable ulcer with significant slough.


Neurologic: No aphasia. No facial droop. No change in mentation. No head injury.

No headache. No paralysis. No paresthesia.


Psychiatric: positive for depression, anxiety .


Endocrine: significant loss of weight.  











PHYSICAL EXAMINATION:





General: This is an 80-year-old  female who is frail laying down in bed

appears to be in mild respiratory distress. 


HEENT: Head is atraumatic, normocephalic, pupils were equal round reactive to 

light and recommendation, extraocular muscle movement were intact, sclera 

nonicteric, conjunctivae were pale, mucous membranes of the mouth are somewhat 

dry.


Neck: Supple, no JVP, normal carotid upstroke bilaterally, no lymphadenopathy.


Chest: Decreased breath sounds at the bases, few rhonchi, no expiratory wheezes,

no chest wall tenderness, no intercostal retractions.


Heart: First heart sound is normal, second heart sounds normal, there is 

systolic ejection murmur 2/6 located in the left sternal border, there is 

permanent pacemaker.


Abdomen: Soft, nontender, nondistended, positive bowel sounds.  Positive for 

colostomy bag that is full with liquid stool.


Extremities: There is  +2 edema no calf tenderness DP +1 bilaterally.


Neurologic examination: Patient is awake alert and oriented X3, cranial nerves 

II-12 appear grossly intact, muscle power were 3 out of 5 in upper extremities 

and 3 out of 5 in bilateral lower extremities, deep tendon reflexes normal 

bilaterally.





ASSESSMENT AND PLAN:





1.  Cardiac arrest that required a short period of resuscitation by the dialysis

staff as well as the EMS, patient had regained consciousness immediately, the 

exact etiology is unclear, patient does have a permanent pacemaker this will be 

interrogated, patient was seen and evaluated by pulmonary, cardiology, 

nephrology, infectious disease, and it was thought that the troponin elevation 

likely related to chronic kidney disease without evidence of ischemic disease at

this time, patient is well known to have an ischemic cardiomyopathy with 

ejection fraction of 10% on a recent echocardiogram that was done less than a 

month ago, we'll continue to monitor the patient very closely.





2.  Septic shock likely related to infected decubitus unstageable sacral wound 

continue with Santyl and wet to dry dressing to the sacral area, continue IV 

antibiotic in the form of cefepime as well as vancomycin pharmacy to dose its 

peak and trough, infectious disease consultation appreciated, blood cultures 

will be obtained so far is negative, continue IV fluid at 50 mL an hour.





3.  End stage renal disease on hemodialysis Monday Wednesday and  Friday.  

Consult nephrology for hemodialysis patient did receive dialysis today.





4.  Hypertension and hypertensive cardio vascular disease currently hypotensive.

 Continue IV fluid Normal saline at 50 ml/h, we will continue with Proamatine 10

mg orally tid.





5.  Coronary artery disease status post PCI of the LAD with ischemic 

cardiomyopathy.  we will continue with Metoprolol 25 mg orally bid.





6.  Hyperlipidemia.  Continue Lipitor 80 mg orally once every day.





7.  Paroxysmal atrial fibrillation.  Continue Franklyn was 2.5 mg orally twice every

day as well as amiodarone 200 mg orally once every day.





8.  Essential tremor.  Stable.





9.  Esophageal cancer status post esophagectomy stable.





10.  History of ischemic bowel status post colectomy with colostomy placement 

stable.





11.  Sick sinus syndrome status post permanent pacemaker placement. post pacer 

interogation.





12.  Anemia of chronic kidney disease.  Continue Aranesp 40 mg subcutaneously 

every 7 days .





13.  Orthostatic hypotension.  Continue midodrine 10 mg orally 3 times every 

day.





14. Severe protein calorie Malnutrition. we will start Nepro 1 can p tid, we 

will start Mirtazapine 7.5 mg po qhs.





15. Depression and Anxiety . we will continue with Sertraline 25 mg orally 

daily.





16. Unstageable sacral decubitus with slough. we will continue with Santyl 

dressing, keep turning the patient every 2hours, start Nepro and Mirtazapine.





17.  DVT prophylaxis.  Currently on Eliquis 2.5 minute gram orally twice every 

day.





18.  GI prophylaxis.  Continue Protonix 40 mg IV push every 24 hours.





19.  Disposition return to [facility].





Objective





- Vital Signs


Vital signs: 


                                   Vital Signs











Temp  97.4 F L  05/13/21 16:21


 


Pulse  75   05/13/21 16:21


 


Resp  16   05/13/21 16:21


 


BP  124/58   05/13/21 16:21


 


Pulse Ox  97   05/13/21 16:21








                                 Intake & Output











 05/12/21 05/13/21 05/13/21





 18:59 06:59 18:59


 


Intake Total 0  0


 


Output Total 50 0 


 


Balance -50 0 0


 


Weight  50 kg 50 kg


 


Intake:   


 


  Oral 0  0


 


Output:   


 


  Urine  0 


 


  Stool 50  


 


  Hemodialysis 0  


 


Other:   


 


  Voiding Method Indwelling Catheter Indwelling Catheter Indwelling Catheter


 


  # Voids  0 


 


  # Bowel Movements  1 














- Labs


CBC & Chem 7: 


                                 05/13/21 06:34





                                 05/13/21 06:34


Labs: 


                  Abnormal Lab Results - Last 24 Hours (Table)











  05/12/21 05/13/21 05/13/21 Range/Units





  07:28 06:34 06:34 


 


RBC   2.76 L   (3.80-5.40)  m/uL


 


Hgb   9.7 L   (11.4-16.0)  gm/dL


 


Hct   31.5 L   (34.0-46.0)  %


 


MCV   114.2 H   (80.0-100.0)  fL


 


MCH   35.2 H   (25.0-35.0)  pg


 


MCHC   30.8 L   (31.0-37.0)  g/dL


 


RDW   16.0 H   (11.5-15.5)  %


 


Plt Count   81 L   (150-450)  k/uL


 


Lymphocytes #   0.3 L   (1.0-4.8)  k/uL


 


Macrocytosis   Marked A   


 


Chloride    117 H  ()  mmol/L


 


Carbon Dioxide    21 L  (22-30)  mmol/L


 


Creatinine    1.47 H  (0.52-1.04)  mg/dL


 


Glucose    106 H  (74-99)  mg/dL


 


Calcium    8.0 L  (8.4-10.2)  mg/dL


 


Iron  33 L    ()  ug/dL


 


TIBC  182 L    (228-460)  ug/dL


 


Ferritin  2902.9 H    (10.0-291.0)  ng/mL


 


AST    96 H  (14-36)  U/L


 


ALT    78 H  (4-34)  U/L


 


Alkaline Phosphatase    200 H  ()  U/L


 


Total Protein    4.8 L  (6.3-8.2)  g/dL


 


Albumin    2.1 L  (3.5-5.0)  g/dL








                      Microbiology - Last 24 Hours (Table)











 05/11/21 10:07 Urine Culture - Final





 Urine,Voided    Candida glabrata


 


 05/10/21 18:46 Blood Culture - Preliminary





 Blood    No Growth after 48 hours


 


 05/10/21 19:52 Gram Stain - Preliminary





 Other - Other Wound Culture - Preliminary





    Presumptive Staph aureus

## 2021-05-13 NOTE — PN
PROGRESS NOTE



DATE OF SERVICE:

05/13/2021



REASON FOR FOLLOWUP:

1. Sacral pressure ulcer, infected.

2. Question of _____ infection.



INTERVAL HISTORY:

The patient is afebrile.  The patient is more awake and alert. The patient is breathing

comfortably.  No chest pain, shortness of breath or cough.  No nausea, vomiting,

abdominal pain or any worsening pain to the sacral wound area.



PHYSICAL EXAMINATION:

Blood pressure 108/65, pulse of 80, temperature 97.7. She is 95% on 5 L nasal cannula.

General description is an elderly female lying in bed in no distress.

RESPIRATORY SYSTEM: Unlabored breathing. Clear to auscultation anteriorly.

HEART: S1, S2.  Regular rate and rhythm.

ABDOMEN: Soft. No tenderness.

EXTREMITIES: No edema of the feet.



LABS:

Hemoglobin is 9.7, white count 6.3, BUN of 12, creatinine 1.47. Sacral culture with

MSSA,  anaerobes, Gram-negative bacilli.



DIAGNOSTIC IMPRESSION AND PLAN:

Patient with infected sacral pressure ulcer.  Culture with MSSA and anaerobes.  The

patient does have PENICILLIN ALLERGY and is a dialysis-dependent patient. Option can be

vancomycin through dialysis along with oral Flagyl.  Will discuss further with the case

manager.  Local care to continue with Santyl and monitor her clinical course closely.





MMODL / IJN: [id number] / Job#: [id number]

## 2021-05-13 NOTE — P.PN
Subjective


Progress Note Date: 05/13/21


Principal diagnosis: 





Cardiac arrest





This is an 80-year-old female, known history of end-stage renal disease, patient

is hemodialysis dependent.  Patient is normally on dialysis on Monday Wednesday 

and Friday schedule.  Patient was in the hemodialysis unit yesterday, and she 

became unresponsive.  This happened around the time of the initiation of 

dialysis treatment patient went unresponsive and went cyanotic.  CPR was started

by the hemodialysis unit staff, and EMS responded and found the patient cyanotic

and unresponsive.  In route to the emergency room, patient had return of 

spontaneous circulation, and she became responsive again, and she did not 

require intubation or mechanical ventilation.  However the patient was 

hypotensive requiring starting norepinephrine and she had a central line placed 

in her right groin by the ER physician.  Patient had no complaints except for 

pain in the tailbone area she had no shortness of breath, no cough, no wheezing,

no nausea, no vomiting, no abdominal pain patient was given fluid boluses in the

ER, and she was started empirically on antibiotics since she had 2 potential 

sources of sepsis including urine and sacral decubitus ulcer.  And leukocytosis 

with WBC count of 15.1.  She had a low bicarb of 18 with anion gap of 18 her BUN

was 47 creatinine 3.5 and lactic acid was 4.7.  Troponin was 0.05.  Patient 

remained in the ER overnight, and she was accepted to the unit but no beds 

available I evaluated the patient in the ER, she is on very minimal dose of 

norepinephrine which will likely be discontinued today.  And the patient remains

hemodynamically stable and she is only on few liters nasal cannula, then we can 

potentially continue present supportive care measures including antibiotics, 

hemodialysis, and potentially transfer the patient to a monitored bed on 

selective instead of ICU bed.  During my evaluation to the patient in the ER, 

she is awake, alert and oriented 3, and not in any distress.  Patient is known 

to have history of cardiomyopathy and LV dysfunction, her last ejection fraction

in February 2020 was 30-35%.  She is also known to have history of severe 

pulmonary hypertension and moderate to severe mitral regurgitation.  PCR for 

COVID-19 was negative.  Chest x-ray this morning showed bilateral infiltrates 

and pleural effusion, I'm strongly suspecting that the patient has unilateral 

pulmonary edema, underlying pneumonia especially aspiration pneumonia is not 

entirely ruled out





On 05/12/2021 patient seen in follow-up selective care unit, she is resting 

comfortably in bed, she appears very frail and weak but appears to be in no 

acute distress, her son is at the bedside.  Her son told us that yesterday 

during hemodialysis patient had a similar episode as the one that brought her to

the hospital initially and the dialysis had to be cut short, patient did not 

become unresponsive but she started experiencing numbness, and headaches, and 

her dialysis had to be cut short, and not much fluid was able to be removed.  

Today's chest x-ray today shows airspace infiltrate throughout the right lung, 

changed or may be slightly more consolidated.  Possible aspiration pneumonia is 

suspected.  This had no fever or chills since admission, she is currently 

requiring 5 L of supplemental oxygen, her pulse ox is 96-98%, blood pressures 

106/54, she is breathing comfortably, no complaints of chest discomfort, no 

hemoptysis, appears very weak and debilitated.  She is on antibiotic coverage in

the form of cefepime and Vancomycin for possibility of aspiration pneumonia.  

Her blood and urine cultures have been negative.  Today's labs have been 

reviewed, white blood cell count is improved, is down to 4.8, hemoglobin is 9.3,

platelet count is 61, sodium is 141, potassium 3.6, chloride is 116, CO2 is 16, 

BUN of 27, and creatinine is 2.35, LFTs are improving, urinalysis showed 

infected urine.





The patient is seen today 05/13/2021 in follow-up on the selective care unit.  

She is currently awake, alert in no acute distress.  Resting fairly comfortably 

in bed.  She remains quite weak and debilitated.  She is maintaining O2 

saturations in the high 90s on 5 L/m per nasal cannula.  She's afebrile.  

Hemodynamically stable.  Blood culture revealing presumptive staph aureus.  

Urine culture with Candida.  White count 6.3.  Hemoglobin 9.7.  Platelet count 

81,000.  Sodium 143.  Potassium 4.0.  Creatinine 1.47.  AST 96.  ALT 78.  She 

remains on amiodarone.  Anticoagulated with Eliquis.  Antibiotics in the form of

cefepime and vancomycin.  0.9 normal saline at 50 MLS per hour.  Plan is for 

possible hemodialysis tomorrow.





Objective





- Vital Signs


Vital signs: 


                                   Vital Signs











Temp  97.4 F L  05/13/21 11:30


 


Pulse  77   05/13/21 11:30


 


Resp  13   05/13/21 11:30


 


BP  109/51   05/13/21 11:30


 


Pulse Ox  98   05/13/21 11:30








                                 Intake & Output











 05/12/21 05/13/21 05/13/21





 18:59 06:59 18:59


 


Intake Total 0  0


 


Output Total 50 0 


 


Balance -50 0 0


 


Weight  50 kg 50 kg


 


Intake:   


 


  Oral 0  0


 


Output:   


 


  Urine  0 


 


  Stool 50  


 


  Hemodialysis 0  


 


Other:   


 


  Voiding Method Indwelling Catheter Indwelling Catheter Indwelling Catheter


 


  # Voids  0 


 


  # Bowel Movements  1 














- Exam





GENERAL EXAM: Alert, alert, frail, cachectic 80-year-old female patient, on 5 L 

nasal cannula, comfortable in no apparent distress.


HEAD: Normocephalic.


EYES: Normal reaction of pupils, equal size.


NOSE: Clear with pink turbinates.


THROAT: No erythema or exudates.


NECK: No masses, no JVD.


CHEST: No chest wall deformity.


LUNGS: Equal air entry with crackles in the right lung base.


CVS: S1 and S2 normal with no audible murmur, regular rhythm.


ABDOMEN: No hepatosplenomegaly, normal bowel sounds, no guarding or rigidity.


SPINE: No scoliosis or deformity


SKIN: No rashes


CENTRAL NERVOUS SYSTEM: No focal deficits, tone is normal in all 4 extremities.


EXTREMITIES: There is no peripheral edema.  No clubbing, no cyanosis.  

Peripheral pulses are intact.





- Labs


CBC & Chem 7: 


                                 05/13/21 06:34





                                 05/13/21 06:34


Labs: 


                  Abnormal Lab Results - Last 24 Hours (Table)











  05/12/21 05/13/21 05/13/21 Range/Units





  07:28 06:34 06:34 


 


RBC   2.76 L   (3.80-5.40)  m/uL


 


Hgb   9.7 L   (11.4-16.0)  gm/dL


 


Hct   31.5 L   (34.0-46.0)  %


 


MCV   114.2 H   (80.0-100.0)  fL


 


MCH   35.2 H   (25.0-35.0)  pg


 


MCHC   30.8 L   (31.0-37.0)  g/dL


 


RDW   16.0 H   (11.5-15.5)  %


 


Plt Count   81 L   (150-450)  k/uL


 


Lymphocytes #   0.3 L   (1.0-4.8)  k/uL


 


Macrocytosis   Marked A   


 


Chloride    117 H  ()  mmol/L


 


Carbon Dioxide    21 L  (22-30)  mmol/L


 


Creatinine    1.47 H  (0.52-1.04)  mg/dL


 


Glucose    106 H  (74-99)  mg/dL


 


Calcium    8.0 L  (8.4-10.2)  mg/dL


 


Iron  33 L    ()  ug/dL


 


TIBC  182 L    (228-460)  ug/dL


 


Ferritin  2902.9 H    (10.0-291.0)  ng/mL


 


AST    96 H  (14-36)  U/L


 


ALT    78 H  (4-34)  U/L


 


Alkaline Phosphatase    200 H  ()  U/L


 


Total Protein    4.8 L  (6.3-8.2)  g/dL


 


Albumin    2.1 L  (3.5-5.0)  g/dL








                      Microbiology - Last 24 Hours (Table)











 05/11/21 10:07 Urine Culture - Final





 Urine,Voided    Candida glabrata


 


 05/10/21 18:46 Blood Culture - Preliminary





 Blood    No Growth after 48 hours


 


 05/10/21 19:52 Gram Stain - Preliminary





 Other - Other Wound Culture - Preliminary





    Presumptive Staph aureus














Assessment and Plan


Assessment: 





1  Cardiac arrest, requiring CPR, during episode of hemodialysis, with return of

spontaneous circulation on 05/11/2021





2  Possible acute myocardial infarction, cardiology is following





3  History of ischemic cardiomyopathy and LV dysfunction





4  End-stage renal disease on hemodialysis





5  Suspect sepsis and possibly septic shock related to urinary tract infection 

and sacral decubitus wound infection, currently on cefepime and vancomycin





6  Possible aspiration pneumonia





7  History of chronic A. fib maintained on Eliquis and beta blockers





8  History of hypothyroidism





9  Benign essential hypertension





10  Chronic anemia secondary to chronic renal disease and end-stage renal 

disease





11  History of dyslipidemia





12  History of ischemic cardiomyopathy and LV dysfunction





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





14  History of esophageal cancer and esophageal surgery





15  General medical debility





Plan:





The patient was seen and evaluated by Dr. Doss


She remains quite frail and debilitated


Unsure if she'll be able to continue to tolerate dialysis


DO NOT RESUSCITATE/DO NOT INTUBATE CODE STATUS


Recommend hospice/comfort care


Continue supportive care for now


Continue antibiotics


Follow-up chest x-ray in a.m.


We'll continue to follow





I, the cosigning physician, performed a history & physical examination of the 

patient. Lungs sounds with crackles in the right lung base.  Maintaining good O2

saturations in the 90s on 5 L/m per nasal cannula.  I discussed the assessment 

and plan of care with my nurse practitioner, Chasity Choe. I attest to the above 

note as dictated by her.

## 2021-05-14 RX ADMIN — MIDODRINE HYDROCHLORIDE SCH MG: 5 TABLET ORAL at 09:49

## 2021-05-14 RX ADMIN — CEFAZOLIN SCH MLS/HR: 330 INJECTION, POWDER, FOR SOLUTION INTRAMUSCULAR; INTRAVENOUS at 09:51

## 2021-05-14 RX ADMIN — COLLAGENASE SANTYL SCH APPLIC: 250 OINTMENT TOPICAL at 09:51

## 2021-05-14 RX ADMIN — APIXABAN SCH MG: 2.5 TABLET, FILM COATED ORAL at 09:50

## 2021-05-14 RX ADMIN — CEFAZOLIN SCH MLS/HR: 330 INJECTION, POWDER, FOR SOLUTION INTRAMUSCULAR; INTRAVENOUS at 18:31

## 2021-05-14 RX ADMIN — SERTRALINE HYDROCHLORIDE SCH MG: 25 TABLET ORAL at 09:50

## 2021-05-14 RX ADMIN — MORPHINE SULFATE PRN MG: 2 INJECTION, SOLUTION INTRAMUSCULAR; INTRAVENOUS at 15:46

## 2021-05-14 RX ADMIN — Medication SCH MG: at 21:06

## 2021-05-14 RX ADMIN — MORPHINE SULFATE PRN MG: 2 INJECTION, SOLUTION INTRAMUSCULAR; INTRAVENOUS at 21:06

## 2021-05-14 RX ADMIN — MIDODRINE HYDROCHLORIDE SCH MG: 5 TABLET ORAL at 11:56

## 2021-05-14 RX ADMIN — MIRTAZAPINE SCH MG: 15 TABLET, FILM COATED ORAL at 21:06

## 2021-05-14 RX ADMIN — PANTOPRAZOLE SODIUM SCH MG: 40 TABLET, DELAYED RELEASE ORAL at 06:32

## 2021-05-14 RX ADMIN — MIDODRINE HYDROCHLORIDE SCH MG: 5 TABLET ORAL at 18:30

## 2021-05-14 RX ADMIN — Medication SCH MG: at 09:49

## 2021-05-14 RX ADMIN — AMIODARONE HYDROCHLORIDE SCH MG: 200 TABLET ORAL at 09:50

## 2021-05-14 RX ADMIN — CEFAZOLIN SCH: 330 INJECTION, POWDER, FOR SOLUTION INTRAMUSCULAR; INTRAVENOUS at 11:56

## 2021-05-14 RX ADMIN — ATORVASTATIN CALCIUM SCH MG: 80 TABLET, FILM COATED ORAL at 21:06

## 2021-05-14 RX ADMIN — Medication SCH MG: at 15:50

## 2021-05-14 RX ADMIN — APIXABAN SCH MG: 2.5 TABLET, FILM COATED ORAL at 18:30

## 2021-05-14 RX ADMIN — MORPHINE SULFATE PRN MG: 2 INJECTION, SOLUTION INTRAMUSCULAR; INTRAVENOUS at 02:03

## 2021-05-14 NOTE — P.PN
Subjective





Patient is seen in follow-up for acute kidney injury, hemodialysis dependent.  

She is maintained on hemodialysis on Monday Wednesday Friday schedule.  Blood 

pressure stable.  On 5 L nasal cannula.  Quite weak.  No changes overnight.





Vital signs are stable.


General: The patient appeared well nourished and normally developed. 


HEENT: Head exam is unremarkable. Neck is without jugular venous distension.


LUNGS: Breath sounds decreased.


HEART: Rate and Rhythm are regular.


ABDOMEN: Soft, no distention.


EXTREMITITES: No edema.





Objective





- Vital Signs


Vital signs: 


                                   Vital Signs











Temp  97.7 F   05/14/21 07:48


 


Pulse  72   05/14/21 07:48


 


Resp  16   05/14/21 07:48


 


BP  113/54   05/14/21 07:48


 


Pulse Ox  91 L  05/14/21 07:48








                                 Intake & Output











 05/13/21 05/14/21 05/14/21





 18:59 06:59 18:59


 


Intake Total 10 275 240


 


Balance 10 275 240


 


Weight 50 kg  


 


Intake:   


 


  Oral 10 275 240


 


Other:   


 


  Voiding Method Indwelling Catheter Indwelling Catheter 














- Labs


CBC & Chem 7: 


                                 05/13/21 06:34





                                 05/13/21 06:34


Labs: 


                      Microbiology - Last 24 Hours (Table)











 05/11/21 10:07 Urine Culture - Final





 Urine,Voided    Candida glabrata


 


 05/10/21 18:46 Blood Culture - Preliminary





 Blood    No Growth after 72 hours


 


 05/10/21 19:52 Anaerobic Culture - Final





 Other - Other    Anaerobic Gm Negative Bacilli


 


 05/10/21 19:52 Gram Stain - Final





 Other - Other Wound Culture - Final





    Staphylococcus aureus














Assessment and Plan


Plan: 





Assessment:


1.  Acute kidney injury, currently hemodialysis dependent.  She is maintained on

hemodialysis on Monday Wednesday Friday schedule outpatient. 


2.  Status post cardiac arrest.


3.  Metabolic acidosis secondary to acute kidney injury.  Improved postdialysis.

 Also on oral bicarb.


4.  UTI versus line sepsis.  On antibiotics.  Infectious disease following.  

Also has a sacral wound with wound culture positive for staph.


5.  Shock s/p Levophed.


6.  Anemia of chronic kidney disease.  On Aranesp.  High ferritin level noted.





Plan:


Hemodialysis today.


Maintain midodrine.


Phosphorus normal.


Discussed with the patient and her son regarding long-term care.  Patient 

becomes quite hypotensive during dialysis and has difficulty with 

ultrafiltration.


Comfort measures were discussed.  Patient wishes to continue with dialysis for 

now.


Overall prognosis guarded.

## 2021-05-14 NOTE — P.PN
Subjective


Progress Note Date: 05/14/21


Principal diagnosis: 


Cardiac arrest





This is an 80-year-old female, known history of end-stage renal disease, patient

is hemodialysis dependent.  Patient is normally on dialysis on Monday Wednesday 

and Friday schedule.  Patient was in the hemodialysis unit yesterday, and she 

became unresponsive.  This happened around the time of the initiation of 

dialysis treatment patient went unresponsive and went cyanotic.  CPR was started

by the hemodialysis unit staff, and EMS responded and found the patient cyanotic

and unresponsive.  In route to the emergency room, patient had return of 

spontaneous circulation, and she became responsive again, and she did not 

require intubation or mechanical ventilation.  However the patient was 

hypotensive requiring starting norepinephrine and she had a central line placed 

in her right groin by the ER physician.  Patient had no complaints except for 

pain in the tailbone area she had no shortness of breath, no cough, no wheezing,

no nausea, no vomiting, no abdominal pain patient was given fluid boluses in the

ER, and she was started empirically on antibiotics since she had 2 potential 

sources of sepsis including urine and sacral decubitus ulcer.  And leukocytosis 

with WBC count of 15.1.  She had a low bicarb of 18 with anion gap of 18 her BUN

was 47 creatinine 3.5 and lactic acid was 4.7.  Troponin was 0.05.  Patient 

remained in the ER overnight, and she was accepted to the unit but no beds 

available I evaluated the patient in the ER, she is on very minimal dose of 

norepinephrine which will likely be discontinued today.  And the patient remains

hemodynamically stable and she is only on few liters nasal cannula, then we can 

potentially continue present supportive care measures including antibiotics, 

hemodialysis, and potentially transfer the patient to a monitored bed on 

selective instead of ICU bed.  During my evaluation to the patient in the ER, 

she is awake, alert and oriented 3, and not in any distress.  Patient is known 

to have history of cardiomyopathy and LV dysfunction, her last ejection fraction

in February 2020 was 30-35%.  She is also known to have history of severe 

pulmonary hypertension and moderate to severe mitral regurgitation.  PCR for 

COVID-19 was negative.  Chest x-ray this morning showed bilateral infiltrates 

and pleural effusion, I'm strongly suspecting that the patient has unilateral 

pulmonary edema, underlying pneumonia especially aspiration pneumonia is not 

entirely ruled out





On 05/12/2021 patient seen in follow-up selective care unit, she is resting 

comfortably in bed, she appears very frail and weak but appears to be in no 

acute distress, her son is at the bedside.  Her son told us that yesterday 

during hemodialysis patient had a similar episode as the one that brought her to

the hospital initially and the dialysis had to be cut short, patient did not 

become unresponsive but she started experiencing numbness, and headaches, and 

her dialysis had to be cut short, and not much fluid was able to be removed.  

Today's chest x-ray today shows airspace infiltrate throughout the right lung, 

changed or may be slightly more consolidated.  Possible aspiration pneumonia is 

suspected.  This had no fever or chills since admission, she is currently 

requiring 5 L of supplemental oxygen, her pulse ox is 96-98%, blood pressures 

106/54, she is breathing comfortably, no complaints of chest discomfort, no 

hemoptysis, appears very weak and debilitated.  She is on antibiotic coverage in

the form of cefepime and Vancomycin for possibility of aspiration pneumonia.  

Her blood and urine cultures have been negative.  Today's labs have been 

reviewed, white blood cell count is improved, is down to 4.8, hemoglobin is 9.3,

platelet count is 61, sodium is 141, potassium 3.6, chloride is 116, CO2 is 16, 

BUN of 27, and creatinine is 2.35, LFTs are improving, urinalysis showed 

infected urine.





On 05/14/2021 patient seen in follow-up on [facility] care unit, she is awake, 

she is oriented to self, and place.  Appears very frail, weak, fatigued, but no 

evidence of respiratory distress, no complaints of chest discomfort.  She is 

currently on 5 L of oxygen her pulse ox at 100%, hemodynamically she has been 

stable, she is having a hemodialysis treatment this morning, and so far she is 

tolerating it well, there has been no recurrence of hypotension, no recurrence 

of symptoms of headache, altered mentation, or increased shortness of breath or 

chest discomfort during hemodialysis.  Is afebrile, her breathing is nonlabored,

 chest x-ray today showing increasing infiltrates throughout the right lung with

underlying pleural effusion, and there is also increasing infiltrate at the left

lower lobe as well.  She remains on antibiotics in the form of cefepime for 

evidence of urinary tract infection.  Her wound cultures showed staph aureus, 

and anaerobic gram-negative bacilli, blood culture was negative, urine culture 

showed Candida glabrate.  Today's white count is 6.3, hemoglobin is 9.7, pl

atelet count is 81, sodium is 143, potassium is 4.0, chloride is 117, CO2 is 21,

BUN is 12 and creatinine is 1.47.  Renal profile seems to have improved since 

admission.  Nephrology is following.  Patient's appetite has been poor, she has 

been quite weak, and pretty well bedbound.  Her current CODE STATUS is DO NOT 

RESUSCITATE, and recommendation was made for hospice and palliative care, 

however the patient herself at this time is not ready for palliative care 

consultation and would like to continue with supportive care at this time minus 

the heroic lifesaving measures and life-support. 








Objective





- Vital Signs


Vital signs: 


                                   Vital Signs











Temp  98.0 F   05/14/21 11:48


 


Pulse  83   05/14/21 11:48


 


Resp  16   05/14/21 11:48


 


BP  115/59   05/14/21 11:48


 


Pulse Ox  100   05/14/21 11:48








                                 Intake & Output











 05/13/21 05/14/21 05/14/21





 18:59 06:59 18:59


 


Intake Total 10 275 240


 


Output Total   100


 


Balance 10 275 140


 


Weight 50 kg  


 


Intake:   


 


  Oral 10 275 240


 


Output:   


 


  Urine   50


 


  Stool   50


 


Other:   


 


  Voiding Method Indwelling Catheter Indwelling Catheter Indwelling Catheter














- Exam


 GENERAL EXAM: Alert, weak, and debilitated, 80-year-old white female, comfo

rtable.  His oxygen with pulse ox of 96-98%, comfortable in no apparent 

distress.


HEAD: Normocephalic/atraumatic.


EYES: Normal reaction of pupils, equal size.  Conjunctiva pink, sclera white.


NOSE: Clear with pink turbinates.


THROAT: No erythema or exudates.


NECK: No masses, no JVD, no thyroid enlargement, no adenopathy.


CHEST: No chest wall deformity.  Symmetrical expansion. 


LUNGS: Equal air entry with no crackles, wheeze, rhonchi or dullness.


CVS: Regular rate and rhythm, normal S1 and S2, no gallops, no murmurs, no rubs


ABDOMEN: Soft, nontender.  No hepatosplenomegaly, normal bowel sounds, no 

guarding or rigidity.


EXTREMITIES: No clubbing, no edema, no cyanosis, 2+ pulses and upper and lower 

extremities.


MUSCULOSKELETAL: Muscle strength and tone normal.


SPINE: No scoliosis or deformity


SKIN: No rashes.  Patient has a sacral wound in place


CENTRAL NERVOUS SYSTEM: Alert and oriented -3.  No focal deficits, tone is 

normal in all 4 extremities.


PSYCHIATRIC: Alert and oriented -3.  Appropriate affect.  Intact judgment and 

insight.











- Labs


CBC & Chem 7: 


                                 05/13/21 06:34





                                 05/13/21 06:34


Labs: 


                      Microbiology - Last 24 Hours (Table)











 05/11/21 10:07 Urine Culture - Final





 Urine,Voided    Candida glabrata


 


 05/10/21 18:46 Blood Culture - Preliminary





 Blood    No Growth after 72 hours


 


 05/10/21 19:52 Anaerobic Culture - Final





 Other - Other    Anaerobic Gm Negative Bacilli


 


 05/10/21 19:52 Gram Stain - Final





 Other - Other Wound Culture - Final





    Staphylococcus aureus














Assessment and Plan


Plan: 


 Assessment:





#1.  Cardiac arrest, requiring CPR, during episode of hemodialysis, with return 

of spontaneous circulation on 05/11/2021.  Patient's pacemaker interrogation did

not reveal any recorded alert sore events and as such true cardiac arrest was 

unlikely





#2.  Possible acute myocardial infarction, cardiology is following





#3.  History of ischemic cardiomyopathy and LV dysfunction





#4.  End-stage renal disease on hemodialysis





#5.  Suspect sepsis and possibly septic shock related to urinary tract infection

and sacral decubitus wound infection, currently on cefepime and vancomycin





#6.  Possible aspiration pneumonia





#7.  History of chronic A. fib maintained on Eliquis and beta blockers





#8.  History of hypothyroidism





#9.  Benign essential hypertension





#10.  Chronic anemia secondary to chronic renal disease and end-stage renal 

disease





#11.  History of dyslipidemia





#12.  History of ischemic cardiomyopathy and LV dysfunction





#13.  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





#14.  History of esophageal cancer and esophageal surgery





#15.  General medical debility





Plan:





No worsening dyspnea, today's chest x-ray has been reviewed showing changes of 

worsening infiltrates


Patient tolerated hemodialysis treatment today


Vital signs have been stable


Continues on antibiotics for sepsis related to acute urinary tract infection and

sacral decubitus wound infection


Overall remains quite weak, debilitated, she has had poor appetite, and has been

pretty well bedbound


However she is awake and alert, and she is able to make decisions for herself


She wants to continue with supportive care, and she is not ready for palliative 

care just yet


We'll continue supportive treatment


From pulmonary perspective she has remained stable


She can be considered for discharge back to [facility]








I performed a history & physical examination of the patient and discussed their 

management with my nurse practitioner, Edith Funes.  I reviewed the nurse 

practitioner's note and agree with the documented findings and plan of care.  

Lung sounds are positive for diminished breath sounds with bibasilar crackles.  

The findings and the impression was discussed with the patient.  I attest to the

documentation by the nurse practitioner. 








Time with Patient: Less than 30

## 2021-05-14 NOTE — XR
EXAMINATION TYPE: XR chest 1V portable

 

DATE OF EXAM: 5/14/2021

 

HISTORY: Shortness of breath.

 

COMPARISON: 5/12/2021

 

TECHNIQUE: Single view of the chest is submitted.

 

FINDINGS:

Demonstrated are scattered senescent parenchymal change.  

 

Increasing infiltrate throughout the right lung with underlying pleural effusion. There is also incre
asing infiltrate left lower lobe. Patient rotation does limit evaluation.

 

The heart is stable.

 

Hilar and mediastinal structures are within normal limits.  

 

Degenerative changes are seen of the dorsal spine. 

 

 IMPRESSION: 

 

1.  Worsening infiltrates.

## 2021-05-14 NOTE — PN
PROGRESS NOTE



DATE OF SERVICE:

05/14/2021



REASON FOR FOLLOWUP:

Infected sacral pressure ulcer.



INTERVAL HISTORY:

The patient is currently afebrile. The patient is breathing comfortably. The patient

denies having any chest pain or shortness of breath or any cough.  No abdominal pain.

Overall pain to the sacral wound is currently controlled.



PHYSICAL EXAMINATION:

Blood pressure 115/59 with a pulse of 83, temperature 98. She is 100% on 5 L nasal

cannula.

General description is an elderly female lying in bed in no distress.

RESPIRATORY SYSTEM: Unlabored breathing. Clear to auscultation anteriorly.

HEART: S1, S2.  Regular rate and rhythm.

ABDOMEN: Soft. No tenderness.

EXTREMITIES: No edema of the feet.



LABS:

No CBC was done today.  Lactic acid 1.4. Sacral wound with MSSA and anaerobic Gram-

negative bacilli. Urine with Candida glabrata.



DIAGNOSTIC IMPRESSION AND PLAN:

Patient admitted to hospital with unresponsiveness. Concern for possible UTI and line-

related sepsis.  However, blood culture has been negative.  The patient did have a

sacral wound and a culture positive for MSSA anaerobes.  Antibiotics currently with

cefepime and vancomycin. That can be adjusted to cefazolin and Flagyl.  Local care to

continue with Santyl and monitor clinical course closely.





MMODL / IJN: [id number] / Job#: [id number]

## 2021-05-15 LAB
ALBUMIN SERPL-MCNC: 2.1 G/DL (ref 3.5–5)
ALP SERPL-CCNC: 196 U/L (ref 38–126)
ALT SERPL-CCNC: 46 U/L (ref 4–34)
ANION GAP SERPL CALC-SCNC: 7 MMOL/L
AST SERPL-CCNC: 50 U/L (ref 14–36)
BASOPHILS # BLD AUTO: 0 K/UL (ref 0–0.2)
BASOPHILS NFR BLD AUTO: 0 %
BUN SERPL-SCNC: 9 MG/DL (ref 7–17)
CALCIUM SPEC-MCNC: 8.1 MG/DL (ref 8.4–10.2)
CHLORIDE SERPL-SCNC: 113 MMOL/L (ref 98–107)
CO2 SERPL-SCNC: 23 MMOL/L (ref 22–30)
EOSINOPHIL # BLD AUTO: 0 K/UL (ref 0–0.7)
EOSINOPHIL NFR BLD AUTO: 0 %
ERYTHROCYTE [DISTWIDTH] IN BLOOD BY AUTOMATED COUNT: 3.19 M/UL (ref 3.8–5.4)
ERYTHROCYTE [DISTWIDTH] IN BLOOD: 16.6 % (ref 11.5–15.5)
GLUCOSE SERPL-MCNC: 87 MG/DL (ref 74–99)
HBV SURFACE AB SERPL IA-ACNC: 3.5 MIU/ML
HCT VFR BLD AUTO: 35.7 % (ref 34–46)
HGB BLD-MCNC: 10.6 GM/DL (ref 11.4–16)
LYMPHOCYTES # SPEC AUTO: 0.4 K/UL (ref 1–4.8)
LYMPHOCYTES NFR SPEC AUTO: 3 %
MCH RBC QN AUTO: 33.2 PG (ref 25–35)
MCHC RBC AUTO-ENTMCNC: 29.7 G/DL (ref 31–37)
MCV RBC AUTO: 111.9 FL (ref 80–100)
MONOCYTES # BLD AUTO: 0.4 K/UL (ref 0–1)
MONOCYTES NFR BLD AUTO: 4 %
NEUTROPHILS # BLD AUTO: 9.3 K/UL (ref 1.3–7.7)
NEUTROPHILS NFR BLD AUTO: 91 %
PLATELET # BLD AUTO: 105 K/UL (ref 150–450)
POTASSIUM SERPL-SCNC: 3.2 MMOL/L (ref 3.5–5.1)
PROT SERPL-MCNC: 4.7 G/DL (ref 6.3–8.2)
SODIUM SERPL-SCNC: 143 MMOL/L (ref 137–145)
VANCOMYCIN SERPL-MCNC: 16 UG/ML
WBC # BLD AUTO: 10.3 K/UL (ref 3.8–10.6)

## 2021-05-15 RX ADMIN — MIDODRINE HYDROCHLORIDE SCH MG: 5 TABLET ORAL at 18:46

## 2021-05-15 RX ADMIN — CEFAZOLIN SCH MLS/HR: 330 INJECTION, POWDER, FOR SOLUTION INTRAMUSCULAR; INTRAVENOUS at 11:02

## 2021-05-15 RX ADMIN — PANTOPRAZOLE SODIUM SCH MG: 40 TABLET, DELAYED RELEASE ORAL at 06:43

## 2021-05-15 RX ADMIN — ATORVASTATIN CALCIUM SCH MG: 80 TABLET, FILM COATED ORAL at 20:10

## 2021-05-15 RX ADMIN — Medication SCH MG: at 10:43

## 2021-05-15 RX ADMIN — MIDODRINE HYDROCHLORIDE SCH MG: 5 TABLET ORAL at 10:43

## 2021-05-15 RX ADMIN — SERTRALINE HYDROCHLORIDE SCH: 25 TABLET ORAL at 12:57

## 2021-05-15 RX ADMIN — ONDANSETRON PRN MG: 2 INJECTION INTRAMUSCULAR; INTRAVENOUS at 20:15

## 2021-05-15 RX ADMIN — AMIODARONE HYDROCHLORIDE SCH MG: 200 TABLET ORAL at 10:43

## 2021-05-15 RX ADMIN — APIXABAN SCH MG: 2.5 TABLET, FILM COATED ORAL at 18:46

## 2021-05-15 RX ADMIN — CEFAZOLIN SCH: 330 INJECTION, POWDER, FOR SOLUTION INTRAMUSCULAR; INTRAVENOUS at 12:57

## 2021-05-15 RX ADMIN — MIRTAZAPINE SCH MG: 15 TABLET, FILM COATED ORAL at 20:10

## 2021-05-15 RX ADMIN — MIDODRINE HYDROCHLORIDE SCH MG: 5 TABLET ORAL at 13:36

## 2021-05-15 RX ADMIN — COLLAGENASE SANTYL SCH: 250 OINTMENT TOPICAL at 19:26

## 2021-05-15 RX ADMIN — Medication SCH MG: at 18:46

## 2021-05-15 RX ADMIN — APIXABAN SCH MG: 2.5 TABLET, FILM COATED ORAL at 10:43

## 2021-05-15 RX ADMIN — Medication SCH MG: at 20:10

## 2021-05-15 NOTE — PN
PROGRESS NOTE



Patient is seen for followup for end-stage renal disease.  The patient is currently

lying in bed.  She is comfortable.  She is complaining of nausea.  Overall, in a very

frail condition.  She has not been able to tolerate dialysis as outpatient with

significant hypotension.



PHYSICAL EXAMINATION:

On examination today, blood pressure 103/59, heart rate 90 per minute, she is afebrile.

Examination of the heart S1, S2.  Examination of the lungs, decreased breath sounds at

bases.

Abdomen is soft, nontender.  Examination of lower extremities shows edema 2+.  CNS

exam:  Patient is not moving her lower extremities much.  She is alert and oriented x3.



LAB:

Show sodium 143, potassium 3.2, chloride 113, BUN 9, creatinine 1.59, hemoglobin 10.6

g/dL.



ASSESSMENT:

1. End-stage renal disease, now with previous acute kidney injury. Started on dialysis

    about 6-8 weeks ago.  The patient does not have significant urine output and she

    remains dialysis dependent.  However, it has been difficult to dialyze the patient

    given the significant hypotension, overall generalized weak condition and patient

    is has been significantly frail.

2. Status post cardiac arrest.

3. Urinary tract infection.

4. Sacral wound with wound culture positive for Staphylococcus.

5. Anemia of chronic disease.



PLAN:

Continue antibiotics.  We will re-evaluate on Monday after hemodialysis.  I will

decrease the IV fluids since patient has low urine output.  We will also likely

discontinue the sodium bicarb or decrease it depending on the labs tomorrow.





MMODL / IJN: [id number] / Job#: [id number]

## 2021-05-15 NOTE — PN
PROGRESS NOTE



DATE OF SERVICE:

05/15/2021



REASON FOR FOLLOWUP:

UTI, infected sacral pressure ulcer.



INTERVAL HISTORY:

Patient is currently afebrile.  The patient is breathing comfortably.  No chest pain,

shortness of breath or cough and no worsening pain to the sacral wound area.



PHYSICAL EXAMINATION:

Blood pressure 103/59, pulse of 90, temperature 97.6.  She is 91% on 5 L nasal cannula.

General description is an elderly female lying in bed in no distress.

Respiratory system: Unlabored breathing, clear to auscultation anteriorly.

Heart S1-S2 regular rate and rhythm.

ABDOMEN:  Soft, no tenderness.



LABS:

Hemoglobin 7.1, white count 10.3, BUN of 9, creatinine 1.59.



DIAGNOSTIC IMPRESSION AND PLAN:

Patient admitted to the hospital with and episode of unresponsiveness in this patient

who did have a sacral pressure ulcer status post debridement.  Cultures did grow

multiple pathogens, MSSA anaerobes.  Patient is covered with cefazolin and Flagyl.

Local care to continue with Santyl and keep the area off the pressure.





MMODL / IJN: [id number] / Job#: [id number]

## 2021-05-15 NOTE — P.PN
Subjective


Progress Note Date: 05/15/21





HISTORY OF PRESENT ILLNESS:





This is an 80-year-old  female one of my patient with a previous med

ical history significant for coronary artery disease status post ST elevation 

myocardial infarction back in 02/08/2020 post PCI and bare metal stent in the 

LAD, hypertension and hypertensive cardiovascular disease, history of paroxysmal

atrial fibrillation, history of chronic kidney disease stage III, history of 

chronic tremor, history of Esophageal cancer, history of ischemic bowel post 

partial colectomy with colostomy placement, patient was recently hospitalized at

[facility] after she was admitted to the hospital was significant

edema both lower extremities and mental status changes she was found to have an 

acute kidney injury that is oliguric on top of chronic kidney disease stage 

IIIB, patient became hyperkalemic and she did not make much urine, she ended up 

starting on hemodialysis about a month ago Monday was in Friday because she 

never recovered from her ATN, beside patient was found to have an ischemic 

cardiomyopathy with ejection fraction of 10%, patient was at her dialysis center

yesterday and few minutes into the dialysis she became unresponsive she became 

quite blue and as a matter fact she arrested a CODE BLUE was called and the 

patient was started on resuscitation at the dialysis center EMS was called and 

the patient was brought into the emergency department at [facility] and

dropped to the hospital she regained consciousness, patient was never intubated,

she had a pulse, she was seen in the emergency department and she was quite 

hypotensive, a working diagnosis of septic shock was entertained due to her 

significant unstageable decubitus ulcer in the sacral area as well as urinary 

tract infection, she was started on IV anabiotic in the form of vancomycin and 

Zosyn, she was also placed on norepinephrine drip, she was supposed to be 

admitted to the intensive care unit, she was seen in consultation by ICU and 

pulmonary as well as cardiology along with infectious disease, the decision was 

made to transfer the patient to telemetry unit after taking her off the 

norepinephrine drip, I had a long conversation with her son and the patient 

regarding her poor prognosis, we recommended to continue aggressive treatment 

for now, but no resuscitation or no code is contemplated.





5/12: Patient is laying down in bed in no apparent distress today, she denies 

any chest pain, less short of breath, she has no fever or chills, she has no 

abdominal pain, nausea or vomiting, she continues to be maintained on vancomycin

and cefepime until the final result of the cultures are back, she has been seen 

by nephrology, cardiology, pulmonary medicine, and infectious disease, the 

working diagnosis would be septic shock due to possible UTI, less likely 

infected decub ulcer, we will continue to monitor the patient very closely, her 

son was at the bedside, she did dialysis today and she tolerated it well, she 

continues to be very Cachectic and I discussed with her and her son wether or 

not she is in agreement to stop dialysis but she declined and wanted to continue

with dialysis as of now, we will restart Mirtazapine 7.5 mg po at bedtime as an 

appetite stimulants,we will continue with Nepro tid, and we will continue with 

current treatment plan, her son is asking for FMLA papers to be filled so he can

be with her, both patient and her son are aware of the poor prognosis. 





5/13: patient is laying down in bed she appears very weak today, she is feeling 

better according to her, she denies any chest pain, less short of breath, she 

has been tolerating her treatment very well, she wanted to continue with 

hemodialysis for now, she has no abdominal pain, her stoma is working well, she 

has pain all over her body, she does hurt in her unstageable ulcer as well as 

both feet they were put in waffled boots, we will continue current treatment 

plan and the plan is to send back to [facility]  with therapy.





5/14: Patient is laying down in bed appears to be more comfortable today, she is

hypotensive during dialysis, she was briefed about comfort measures by myself 

and by the nephrology and she wanted to continue with dialysis at this point in 

time, patient has no coughing or hemoptysis, she has no abdominal pain, she 

continues to be extremely weak, the plan is to continue current management, try 

to send her back to [facility] and try to introduce comfort measures slowly down 

the line, urine culture showed Candida glabrata, and would culture showed MSSA 

as well as anaerobes, patient is ALLERGIC to penicillin subsequent he she will 

be kept on vancomycin for now along with possible oral Flagyl.





5/15: Patient is laying down in bed she is very weak today, she is feeling 

nauseated, she did throw up twice, she is not able to eat today, I will 

discontinue morphine sulfate completely continue oxycodone, start the patient on

Zofran 4 mg IV push every 6 hours as needed, we'll monitor the patient very 

closely appears to be significantly weak, the plan is to send her back to 

[facility] hopefully on Monday.





REVIEW OF SYSTEMS:





Constitutional: No documented fever, no chills, no night sweats. .  positive for

 weaknes severe weight loss,  positive for fatigue no lethargy.  No daytime 

sleepiness.


HEENT: No headache.  No blurred vision or double vision, no loss of vision.  No 

loss of Hearing, no ringing in the ears, no dizziness.  No nasal drainage or 

congestion.  No epistaxis.  No sore throat.


Lungs: positive for shortness of breath, no cough, no sputum production.  No 

wheezing.  Reports dyspnea with activity.


Cardiovascular: No chest pain, positive for  lower extremity edema.  No 

palpitations.  No paroxysmal nocturnal dyspnea.  No orthopnea.  No 

lightheadedness or dizziness.  No syncopal episodes.


Abdominal: Reports abdominal pain.  No nausea, vomiting.  No diarrhea.  No 

constipation.  No bloody or tarry stools reports loss of appetite.


Genitourinary: No dysuria, increased frequency, urgency.  No urinary retention.


Musculoskeletal: No myalgias.  positive for muscle weakness, positive for  gait 

dysfunction, no frequent falls.  positive for back pain and  neck pain.


Integumentary: posiive for sacral unstageable ulcer with significant slough.


Neurologic: No aphasia. No facial droop. No change in mentation. No head injury.

No headache. No paralysis. No paresthesia.


Psychiatric: positive for depression, anxiety .


Endocrine: significant loss of weight.  











PHYSICAL EXAMINATION:





General: This is an 80-year-old  female who is frail laying down in bed

appears to be in mild respiratory distress. 


HEENT: Head is atraumatic, normocephalic, pupils were equal round reactive to 

light and recommendation, extraocular muscle movement were intact, sclera 

nonicteric, conjunctivae were pale, mucous membranes of the mouth are somewhat 

dry.


Neck: Supple, no JVP, normal carotid upstroke bilaterally, no lymphadenopathy.


Chest: Decreased breath sounds at the bases, few rhonchi, no expiratory wheezes,

no chest wall tenderness, no intercostal retractions.


Heart: First heart sound is normal, second heart sounds normal, there is 

systolic ejection murmur 2/6 located in the left sternal border, there is 

permanent pacemaker.


Abdomen: Soft, nontender, nondistended, positive bowel sounds.  Positive for 

colostomy bag that is full with liquid stool.


Extremities: There is  +2 edema no calf tenderness DP +1 bilaterally.


Neurologic examination: Patient is awake alert and oriented X3, cranial nerves 

II-12 appear grossly intact, muscle power were 3 out of 5 in upper extremities 

and 3 out of 5 in bilateral lower extremities, deep tendon reflexes normal 

bilaterally.





ASSESSMENT AND PLAN:





1.  Cardiac arrest that required a short period of resuscitation by the dialysis

staff as well as the EMS, patient had regained consciousness immediately, the 

exact etiology is unclear, patient does have a permanent pacemaker this will be 

interrogated, patient was seen and evaluated by pulmonary, cardiology, ne

phrology, infectious disease, and it was thought that the troponin elevation 

likely related to chronic kidney disease without evidence of ischemic disease at

this time, patient is well known to have an ischemic cardiomyopathy with 

ejection fraction of 10% on a recent echocardiogram that was done less than a 

month ago, we'll continue to monitor the patient very closely.





2.  Septic shock likely related to infected decubitus unstageable sacral wound 

continue with Santyl and wet to dry dressing to the sacral area, continue IV 

antibiotic was switched to ceftezole and 2 g IV piggyback every 24 hours, along 

with metronidazole 500 mg orally 3 times every day, she was taken off cefepime 

and vancomycin, plan is to transfer the patient back to [facility] a Monday.





3.  End stage renal disease on hemodialysis Monday Wednesday and  Friday.  

Patient is not tolerating hemodialysis due to hypertension currently 

ultrafiltration.





4.  Hypertension and hypertensive cardiovascular disease currently hypotensive. 

Continue IV fluid Normal saline at 75 ml/h, we will continue with Proamatine 10 

mg orally tid.





5.  Coronary artery disease status post PCI of the LAD with ischemic 

cardiomyopathy.  we will continue with Metoprolol 25 mg orally bid.





6.  Hyperlipidemia.  Continue Lipitor 80 mg orally once every day.





7.  Paroxysmal atrial fibrillation.  Continue Franklyn was 2.5 mg orally twice every

day as well as amiodarone 200 mg orally once every day.





8.  Essential tremor.  Stable.





9.  Esophageal cancer status post esophagectomy stable.





10.  History of ischemic bowel status post colectomy with colostomy placement 

stable.





11.  Sick sinus syndrome status post permanent pacemaker placement. post pacer 

interogation.





12.  Anemia of chronic kidney disease.  Continue Aranesp 40 mg subcutaneously 

every 7 days .





13.  Orthostatic hypotension.  Continue midodrine 10 mg orally 3 times every 

day.





14. Severe protein calorie Malnutrition. we will start Nepro 1 can p tid, we 

will start Mirtazapine 7.5 mg po qhs.





15. Depression and Anxiety . we will continue with Sertraline 50 mg orally 

daily.





16. Unstageable sacral decubitus with slough. we will continue with Santyl 

dressing, keep turning the patient every 2hours, start Nepro and Mirtazapine.





17.  DVT prophylaxis.  Currently on Eliquis 2.5 mg orally twice every day.





18.  GI prophylaxis.  Continue Protonix 40 mg orally once every day.





19.  Hypokalemia.  Patient will be given potassium chloride 20 mEq orally 1.  





20.  Disposition [place] on Monday.





Objective





- Vital Signs


Vital signs: 


                                   Vital Signs











Temp  97.9 F   05/15/21 04:00


 


Pulse  83   05/15/21 04:00


 


Resp  20   05/15/21 04:00


 


BP  108/47   05/15/21 04:00


 


Pulse Ox  92 L  05/15/21 04:00








                                 Intake & Output











 05/14/21 05/15/21 05/15/21





 18:59 06:59 18:59


 


Intake Total 240 300 


 


Output Total 250  


 


Balance -10 300 


 


Weight  42 kg 


 


Intake:   


 


  Oral 240 300 


 


Output:   


 


  Urine 50  


 


  Stool 200  


 


  Hemodialysis 0  


 


Other:   


 


  Voiding Method Indwelling Catheter Indwelling Catheter 














- Labs


CBC & Chem 7: 


                                 05/15/21 08:15





                                 05/15/21 08:15


Labs: 


                  Abnormal Lab Results - Last 24 Hours (Table)











  05/15/21 05/15/21 Range/Units





  08:15 08:15 


 


RBC   3.19 L  (3.80-5.40)  m/uL


 


Hgb   10.6 L  (11.4-16.0)  gm/dL


 


MCV   111.9 H  (80.0-100.0)  fL


 


MCHC   29.7 L  (31.0-37.0)  g/dL


 


RDW   16.6 H  (11.5-15.5)  %


 


Plt Count   105 L  (150-450)  k/uL


 


Macrocytosis   Marked A  


 


Potassium  3.2 L   (3.5-5.1)  mmol/L


 


Chloride  113 H   ()  mmol/L


 


Creatinine  1.59 H   (0.52-1.04)  mg/dL


 


Calcium  8.1 L   (8.4-10.2)  mg/dL


 


AST  50 H   (14-36)  U/L


 


ALT  46 H   (4-34)  U/L


 


Alkaline Phosphatase  196 H   ()  U/L


 


Total Protein  4.7 L   (6.3-8.2)  g/dL


 


Albumin  2.1 L   (3.5-5.0)  g/dL








                      Microbiology - Last 24 Hours (Table)











 05/10/21 18:46 Blood Culture - Preliminary





 Blood    No Growth after 96 hours


 


 05/11/21 10:07 Urine Culture - Final





 Urine,Voided    Candida glabrata

## 2021-05-15 NOTE — P.PN
Subjective


Progress Note Date: 05/14/21





HISTORY OF PRESENT ILLNESS:





This is an 80-year-old  female one of my patient with a previous med

ical history significant for coronary artery disease status post ST elevation 

myocardial infarction back in 02/08/2020 post PCI and bare metal stent in the 

LAD, hypertension and hypertensive cardiovascular disease, history of paroxysmal

atrial fibrillation, history of chronic kidney disease stage III, history of 

chronic tremor, history of Esophageal cancer, history of ischemic bowel post 

partial colectomy with colostomy placement, patient was recently hospitalized at

[facility] after she was admitted to the hospital was significant

edema both lower extremities and mental status changes she was found to have an 

acute kidney injury that is oliguric on top of chronic kidney disease stage 

IIIB, patient became hyperkalemic and she did not make much urine, she ended up 

starting on hemodialysis about a month ago Monday was in Friday because she 

never recovered from her ATN, beside patient was found to have an ischemic 

cardiomyopathy with ejection fraction of 10%, patient was at her dialysis center

yesterday and few minutes into the dialysis she became unresponsive she became 

quite blue and as a matter fact she arrested a CODE BLUE was called and the 

patient was started on resuscitation at the dialysis center EMS was called and 

the patient was brought into the emergency department at [facility] and

dropped to the hospital she regained consciousness, patient was never intubated,

she had a pulse, she was seen in the emergency department and she was quite 

hypotensive, a working diagnosis of septic shock was entertained due to her 

significant unstageable decubitus ulcer in the sacral area as well as urinary 

tract infection, she was started on IV anabiotic in the form of vancomycin and 

Zosyn, she was also placed on norepinephrine drip, she was supposed to be 

admitted to the intensive care unit, she was seen in consultation by ICU and 

pulmonary as well as cardiology along with infectious disease, the decision was 

made to transfer the patient to telemetry unit after taking her off the 

norepinephrine drip, I had a long conversation with her son and the patient 

regarding her poor prognosis, we recommended to continue aggressive treatment 

for now, but no resuscitation or no code is contemplated.





5/12: Patient is laying down in bed in no apparent distress today, she denies 

any chest pain, less short of breath, she has no fever or chills, she has no 

abdominal pain, nausea or vomiting, she continues to be maintained on vancomycin

and cefepime until the final result of the cultures are back, she has been seen 

by nephrology, cardiology, pulmonary medicine, and infectious disease, the 

working diagnosis would be septic shock due to possible UTI, less likely 

infected decub ulcer, we will continue to monitor the patient very closely, her 

son was at the bedside, she did dialysis today and she tolerated it well, she 

continues to be very Cachectic and I discussed with her and her son wether or 

not she is in agreement to stop dialysis but she declined and wanted to continue

with dialysis as of now, we will restart Mirtazapine 7.5 mg po at bedtime as an 

appetite stimulants,we will continue with Nepro tid, and we will continue with 

current treatment plan, her son is asking for FMLA papers to be filled so he can

be with her, both patient and her son are aware of the poor prognosis. 





5/13: patient is laying down in bed she appears very weak today, she is feeling 

better according to her, she denies any chest pain, less short of breath, she 

has been tolerating her treatment very well, she wanted to continue with 

hemodialysis for now, she has no abdominal pain, her stoma is working well, she 

has pain all over her body, she does hurt in her unstageable ulcer as well as 

both feet they were put in waffled boots, we will continue current treatment 

plan and the plan is to send back to [facility]  with therapy.





5/14: Patient is laying down in bed appears to be more comfortable today, she is

hypotensive during dialysis, she was briefed about comfort measures by myself 

and by the nephrology and she wanted to continue with dialysis at this point in 

time, patient has no coughing or hemoptysis, she has no abdominal pain, she 

continues to be extremely weak, the plan is to continue current management, try 

to send her back to [facility] and try to introduce comfort measures slowly down 

the line, urine culture showed Candida glabrata, and would culture showed MSSA 

as well as anaerobes, patient is ALLERGIC to penicillin subsequent he she will 

be kept on vancomycin for now along with possible oral Flagyl.





REVIEW OF SYSTEMS:





Constitutional: No documented fever, no chills, no night sweats. .  positive for

 weaknes severe weight loss,  positive for fatigue no lethargy.  No daytime 

sleepiness.


HEENT: No headache.  No blurred vision or double vision, no loss of vision.  No 

loss of Hearing, no ringing in the ears, no dizziness.  No nasal drainage or 

congestion.  No epistaxis.  No sore throat.


Lungs: positive for shortness of breath, no cough, no sputum production.  No 

wheezing.  Reports dyspnea with activity.


Cardiovascular: No chest pain, positive for  lower extremity edema.  No 

palpitations.  No paroxysmal nocturnal dyspnea.  No orthopnea.  No 

lightheadedness or dizziness.  No syncopal episodes.


Abdominal: Reports abdominal pain.  No nausea, vomiting.  No diarrhea.  No 

constipation.  No bloody or tarry stools reports loss of appetite.


Genitourinary: No dysuria, increased frequency, urgency.  No urinary retention.


Musculoskeletal: No myalgias.  positive for muscle weakness, positive for  gait 

dysfunction, no frequent falls.  positive for back pain and  neck pain.


Integumentary: posiive for sacral unstageable ulcer with significant slough.


Neurologic: No aphasia. No facial droop. No change in mentation. No head injury.

No headache. No paralysis. No paresthesia.


Psychiatric: positive for depression, anxiety .


Endocrine: significant loss of weight.  











PHYSICAL EXAMINATION:





General: This is an 80-year-old  female who is frail laying down in bed

appears to be in mild respiratory distress. 


HEENT: Head is atraumatic, normocephalic, pupils were equal round reactive to 

light and recommendation, extraocular muscle movement were intact, sclera 

nonicteric, conjunctivae were pale, mucous membranes of the mouth are somewhat 

dry.


Neck: Supple, no JVP, normal carotid upstroke bilaterally, no lymphadenopathy.


Chest: Decreased breath sounds at the bases, few rhonchi, no expiratory wheezes,

no chest wall tenderness, no intercostal retractions.


Heart: First heart sound is normal, second heart sounds normal, there is 

systolic ejection murmur 2/6 located in the left sternal border, there is per

manent pacemaker.


Abdomen: Soft, nontender, nondistended, positive bowel sounds.  Positive for 

colostomy bag that is full with liquid stool.


Extremities: There is  +2 edema no calf tenderness DP +1 bilaterally.


Neurologic examination: Patient is awake alert and oriented X3, cranial nerves 

II-12 appear grossly intact, muscle power were 3 out of 5 in upper extremities 

and 3 out of 5 in bilateral lower extremities, deep tendon reflexes normal 

bilaterally.





ASSESSMENT AND PLAN:





1.  Cardiac arrest that required a short period of resuscitation by the dialysis

staff as well as the EMS, patient had regained consciousness immediately, the 

exact etiology is unclear, patient does have a permanent pacemaker this will be 

interrogated, patient was seen and evaluated by pulmonary, cardiology, 

nephrology, infectious disease, and it was thought that the troponin elevation 

likely related to chronic kidney disease without evidence of ischemic disease at

this time, patient is well known to have an ischemic cardiomyopathy with 

ejection fraction of 10% on a recent echocardiogram that was done less than a 

month ago, we'll continue to monitor the patient very closely.





2.  Septic shock likely related to infected decubitus unstageable sacral wound 

continue with Santyl and wet to dry dressing to the sacral area, continue IV 

antibiotic in the form of cefepime as well as vancomycin pharmacy to dose its p

eak and trough, wound culture showed MSSA as well as anaerobes, urine culture 

showed Melinda glabrata, the plan is to switch to vancomycin with dialysis and 

oral metronidazole if tolerated when she goes back to the nursing home.





3.  End stage renal disease on hemodialysis Monday Wednesday and  Friday.  

Consult nephrology for hemodialysis patient did receive dialysis today.





4.  Hypertension and hypertensive cardio vascular disease currently hypotensive.

 Continue IV fluid Normal saline at 50 ml/h, we will continue with Proamatine 10

mg orally tid.





5.  Coronary artery disease status post PCI of the LAD with ischemic 

cardiomyopathy.  we will continue with Metoprolol 25 mg orally bid.





6.  Hyperlipidemia.  Continue Lipitor 80 mg orally once every day.





7.  Paroxysmal atrial fibrillation.  Continue Franklyn was 2.5 mg orally twice every

day as well as amiodarone 200 mg orally once every day.





8.  Essential tremor.  Stable.





9.  Esophageal cancer status post esophagectomy stable.





10.  History of ischemic bowel status post colectomy with colostomy placement 

stable.





11.  Sick sinus syndrome status post permanent pacemaker placement. post pacer 

interogation.





12.  Anemia of chronic kidney disease.  Continue Aranesp 40 mg subcutaneously 

every 7 days .





13.  Orthostatic hypotension.  Continue midodrine 10 mg orally 3 times every 

day.





14. Severe protein calorie Malnutrition. we will start Nepro 1 can p tid, we 

will start Mirtazapine 7.5 mg po qhs.





15. Depression and Anxiety . we will continue with Sertraline 25 mg orally 

daily.





16. Unstageable sacral decubitus with slough. we will continue with Santyl 

dressing, keep turning the patient every 2hours, start Nepro and Mirtazapine.





17.  DVT prophylaxis.  Currently on Eliquis 2.5 minute gram orally twice every 

day.





18.  GI prophylaxis.  Continue Protonix 40 mg IV push every 24 hours.





19.  Disposition return to [facility].





Objective





- Vital Signs


Vital signs: 


                                   Vital Signs











Temp  98.0 F   05/14/21 11:48


 


Pulse  83   05/14/21 11:48


 


Resp  16   05/14/21 11:48


 


BP  115/59   05/14/21 11:48


 


Pulse Ox  100   05/14/21 11:48








                                 Intake & Output











 05/13/21 05/14/21 05/14/21





 18:59 06:59 18:59


 


Intake Total 10 275 240


 


Output Total   100


 


Balance 10 275 140


 


Weight 50 kg  


 


Intake:   


 


  Oral 10 275 240


 


Output:   


 


  Urine   50


 


  Stool   50


 


Other:   


 


  Voiding Method Indwelling Catheter Indwelling Catheter Indwelling Catheter














- Labs


CBC & Chem 7: 


                                 05/15/21 08:15





                                 05/15/21 08:15


Labs: 


                      Microbiology - Last 24 Hours (Table)











 05/11/21 10:07 Urine Culture - Final





 Urine,Voided    Candida glabrata


 


 05/10/21 18:46 Blood Culture - Preliminary





 Blood    No Growth after 72 hours


 


 05/10/21 19:52 Anaerobic Culture - Final





 Other - Other    Anaerobic Gm Negative Bacilli


 


 05/10/21 19:52 Gram Stain - Final





 Other - Other Wound Culture - Final





    Staphylococcus aureus

## 2021-05-15 NOTE — P.PN
Subjective


Progress Note Date: 05/15/21


Principal diagnosis: 





Cardiac arrest





This is an 80-year-old female, known history of end-stage renal disease, patient

is hemodialysis dependent.  Patient is normally on dialysis on Monday Wednesday 

and Friday schedule.  Patient was in the hemodialysis unit yesterday, and she 

became unresponsive.  This happened around the time of the initiation of 

dialysis treatment patient went unresponsive and went cyanotic.  CPR was started

by the hemodialysis unit staff, and EMS responded and found the patient cyanotic

and unresponsive.  In route to the emergency room, patient had return of 

spontaneous circulation, and she became responsive again, and she did not 

require intubation or mechanical ventilation.  However the patient was 

hypotensive requiring starting norepinephrine and she had a central line placed 

in her right groin by the ER physician.  Patient had no complaints except for 

pain in the tailbone area she had no shortness of breath, no cough, no wheezing,

no nausea, no vomiting, no abdominal pain patient was given fluid boluses in the

ER, and she was started empirically on antibiotics since she had 2 potential 

sources of sepsis including urine and sacral decubitus ulcer.  And leukocytosis 

with WBC count of 15.1.  She had a low bicarb of 18 with anion gap of 18 her BUN

was 47 creatinine 3.5 and lactic acid was 4.7.  Troponin was 0.05.  Patient 

remained in the ER overnight, and she was accepted to the unit but no beds 

available I evaluated the patient in the ER, she is on very minimal dose of 

norepinephrine which will likely be discontinued today.  And the patient remains

hemodynamically stable and she is only on few liters nasal cannula, then we can 

potentially continue present supportive care measures including antibiotics, 

hemodialysis, and potentially transfer the patient to a monitored bed on 

selective instead of ICU bed.  During my evaluation to the patient in the ER, 

she is awake, alert and oriented 3, and not in any distress.  Patient is known 

to have history of cardiomyopathy and LV dysfunction, her last ejection fraction

in February 2020 was 30-35%.  She is also known to have history of severe 

pulmonary hypertension and moderate to severe mitral regurgitation.  PCR for 

COVID-19 was negative.  Chest x-ray this morning showed bilateral infiltrates 

and pleural effusion, I'm strongly suspecting that the patient has unilateral 

pulmonary edema, underlying pneumonia especially aspiration pneumonia is not 

entirely ruled out





On 05/12/2021 patient seen in follow-up selective care unit, she is resting 

comfortably in bed, she appears very frail and weak but appears to be in no 

acute distress, her son is at the bedside.  Her son told us that yesterday 

during hemodialysis patient had a similar episode as the one that brought her to

the hospital initially and the dialysis had to be cut short, patient did not 

become unresponsive but she started experiencing numbness, and headaches, and 

her dialysis had to be cut short, and not much fluid was able to be removed.  

Today's chest x-ray today shows airspace infiltrate throughout the right lung, 

changed or may be slightly more consolidated.  Possible aspiration pneumonia is 

suspected.  This had no fever or chills since admission, she is currently 

requiring 5 L of supplemental oxygen, her pulse ox is 96-98%, blood pressures 

106/54, she is breathing comfortably, no complaints of chest discomfort, no 

hemoptysis, appears very weak and debilitated.  She is on antibiotic coverage in

the form of cefepime and Vancomycin for possibility of aspiration pneumonia.  

Her blood and urine cultures have been negative.  Today's labs have been 

reviewed, white blood cell count is improved, is down to 4.8, hemoglobin is 9.3,

platelet count is 61, sodium is 141, potassium 3.6, chloride is 116, CO2 is 16, 

BUN of 27, and creatinine is 2.35, LFTs are improving, urinalysis showed 

infected urine.





The patient is seen today 05/13/2021 in follow-up on the selective care unit.  

She is currently awake, alert in no acute distress.  Resting fairly comfortably 

in bed.  She remains quite weak and debilitated.  She is maintaining O2 

saturations in the high 90s on 5 L/m per nasal cannula.  She's afebrile.  

Hemodynamically stable.  Blood culture revealing presumptive staph aureus.  

Urine culture with Candida.  White count 6.3.  Hemoglobin 9.7.  Platelet count 

81,000.  Sodium 143.  Potassium 4.0.  Creatinine 1.47.  AST 96.  ALT 78.  She 

remains on amiodarone.  Anticoagulated with Eliquis.  Antibiotics in the form of

cefepime and vancomycin.  0.9 normal saline at 50 MLS per hour.  Plan is for 

possible hemodialysis tomorrow.








Patient is seen today 05/15/2021 in follow-up on the selective care unit.  She 

is currently resting in bed.  Awake and alert in no acute distress.  The patient

has been having ongoing issues with pain being managed with oxycodone.  She 

denies any worsening shortness of breath.  She has a loose nonproductive cough. 

Maintaining O2 saturations in the 90s on 5 L/m per nasal cannula.  She's 

afebrile.  Hemodynamically stable.  Remains quite frail and weak.  White count 

10.3.  Hemoglobin 10.6.  Sodium 143.  Potassium 3.2.  Creatinine 1.59.  AST 50. 

ALT 46.  Currently on cefazolin.  Anticoagulate with Eliquis.  0.9 at 75 ML's 

per hour.





Objective





- Vital Signs


Vital signs: 


                                   Vital Signs











Temp  97.9 F   05/15/21 04:00


 


Pulse  83   05/15/21 04:00


 


Resp  20   05/15/21 04:00


 


BP  108/47   05/15/21 04:00


 


Pulse Ox  92 L  05/15/21 04:00








                                 Intake & Output











 05/14/21 05/15/21 05/15/21





 18:59 06:59 18:59


 


Intake Total 240 300 0


 


Output Total 250  


 


Balance -10 300 0


 


Weight  42 kg 


 


Intake:   


 


  Oral 240 300 0


 


Output:   


 


  Urine 50  


 


  Stool 200  


 


  Hemodialysis 0  


 


Other:   


 


  Voiding Method Indwelling Catheter Indwelling Catheter 














- Exam





GENERAL EXAM: Alert, alert, frail, cachectic 80-year-old female patient, on 5 L 

nasal cannula, comfortable in no apparent distress.


HEAD: Normocephalic.


EYES: Normal reaction of pupils, equal size.


NOSE: Clear with pink turbinates.


THROAT: No erythema or exudates.


NECK: No masses, no JVD.


CHEST: No chest wall deformity.


LUNGS: Equal air entry with crackles in the right lung base.


CVS: S1 and S2 normal with no audible murmur, regular rhythm.


ABDOMEN: No hepatosplenomegaly, normal bowel sounds, no guarding or rigidity.


SPINE: No scoliosis or deformity


SKIN: No rashes


CENTRAL NERVOUS SYSTEM: No focal deficits, tone is normal in all 4 extremities.


EXTREMITIES: There is no peripheral edema.  No clubbing, no cyanosis.  

Peripheral pulses are intact.





- Labs


CBC & Chem 7: 


                                 05/15/21 08:15





                                 05/15/21 08:15


Labs: 


                  Abnormal Lab Results - Last 24 Hours (Table)











  05/15/21 05/15/21 Range/Units





  08:15 08:15 


 


RBC   3.19 L  (3.80-5.40)  m/uL


 


Hgb   10.6 L  (11.4-16.0)  gm/dL


 


MCV   111.9 H  (80.0-100.0)  fL


 


MCHC   29.7 L  (31.0-37.0)  g/dL


 


RDW   16.6 H  (11.5-15.5)  %


 


Plt Count   105 L  (150-450)  k/uL


 


Macrocytosis   Marked A  


 


Potassium  3.2 L   (3.5-5.1)  mmol/L


 


Chloride  113 H   ()  mmol/L


 


Creatinine  1.59 H   (0.52-1.04)  mg/dL


 


Calcium  8.1 L   (8.4-10.2)  mg/dL


 


AST  50 H   (14-36)  U/L


 


ALT  46 H   (4-34)  U/L


 


Alkaline Phosphatase  196 H   ()  U/L


 


Total Protein  4.7 L   (6.3-8.2)  g/dL


 


Albumin  2.1 L   (3.5-5.0)  g/dL








                      Microbiology - Last 24 Hours (Table)











 05/10/21 18:46 Blood Culture - Preliminary





 Blood    No Growth after 96 hours


 


 05/11/21 10:07 Urine Culture - Final





 Urine,Voided    Candida glabrata














Assessment and Plan


Assessment: 





1  Cardiac arrest, requiring CPR, during episode of hemodialysis, with return of

spontaneous circulation on 05/11/2021





2  Possible acute myocardial infarction, cardiology is following





3  History of ischemic cardiomyopathy and LV dysfunction





4  End-stage renal disease on hemodialysis





5  Suspect sepsis and possibly septic shock related to urinary tract infection 

and sacral decubitus wound infection, currently on cefepime and vancomycin





6  Possible aspiration pneumonia





7  History of chronic A. fib maintained on Eliquis and beta blockers





8  History of hypothyroidism





9  Benign essential hypertension





10  Chronic anemia secondary to chronic renal disease and end-stage renal 

disease





11  History of dyslipidemia





12  History of ischemic cardiomyopathy and LV dysfunction





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





14  History of esophageal cancer and esophageal surgery





15  General medical debility





Plan:





The patient was seen and evaluated by Dr. Doss


She remains quite frail and debilitated


DO NOT RESUSCITATE/DO NOT INTUBATE CODE STATUS


Recommend hospice/comfort care


Continue supportive care for now


We will see as needed





I, the cosigning physician, performed a history & physical examination of the 

patient. Lungs sounds with crackles in the right lung base.  Maintaining good O2

saturations in the 90s on 5 L/m per nasal cannula.  I discussed the assessment 

and plan of care with my nurse practitioner, Chasity Choe. I attest to the above 

note as dictated by her.

## 2021-05-16 LAB
ANION GAP SERPL CALC-SCNC: 7 MMOL/L
BUN SERPL-SCNC: 17 MG/DL (ref 7–17)
CALCIUM SPEC-MCNC: 8.2 MG/DL (ref 8.4–10.2)
CHLORIDE SERPL-SCNC: 114 MMOL/L (ref 98–107)
CO2 SERPL-SCNC: 23 MMOL/L (ref 22–30)
GLUCOSE SERPL-MCNC: 95 MG/DL (ref 74–99)
POTASSIUM SERPL-SCNC: 3.7 MMOL/L (ref 3.5–5.1)
SODIUM SERPL-SCNC: 144 MMOL/L (ref 137–145)

## 2021-05-16 RX ADMIN — ONDANSETRON PRN MG: 2 INJECTION INTRAMUSCULAR; INTRAVENOUS at 21:20

## 2021-05-16 RX ADMIN — MIDODRINE HYDROCHLORIDE SCH: 5 TABLET ORAL at 15:34

## 2021-05-16 RX ADMIN — MIDODRINE HYDROCHLORIDE SCH MG: 5 TABLET ORAL at 09:29

## 2021-05-16 RX ADMIN — Medication SCH MG: at 21:20

## 2021-05-16 RX ADMIN — AMIODARONE HYDROCHLORIDE SCH MG: 200 TABLET ORAL at 09:29

## 2021-05-16 RX ADMIN — PANTOPRAZOLE SODIUM SCH MG: 40 TABLET, DELAYED RELEASE ORAL at 06:54

## 2021-05-16 RX ADMIN — COLLAGENASE SANTYL SCH: 250 OINTMENT TOPICAL at 18:39

## 2021-05-16 RX ADMIN — MIDODRINE HYDROCHLORIDE SCH: 5 TABLET ORAL at 19:09

## 2021-05-16 RX ADMIN — ATORVASTATIN CALCIUM SCH MG: 80 TABLET, FILM COATED ORAL at 21:20

## 2021-05-16 RX ADMIN — Medication SCH MG: at 09:28

## 2021-05-16 RX ADMIN — SERTRALINE HYDROCHLORIDE SCH MG: 50 TABLET, FILM COATED ORAL at 09:28

## 2021-05-16 RX ADMIN — APIXABAN SCH MG: 2.5 TABLET, FILM COATED ORAL at 09:28

## 2021-05-16 RX ADMIN — APIXABAN SCH MG: 2.5 TABLET, FILM COATED ORAL at 18:38

## 2021-05-16 RX ADMIN — Medication SCH MG: at 18:38

## 2021-05-16 RX ADMIN — MIRTAZAPINE SCH MG: 15 TABLET, FILM COATED ORAL at 21:20

## 2021-05-16 NOTE — PN
PROGRESS NOTE



DATE OF SERVICE:

05/16/2021



REASON FOR FOLLOWUP:

Sacral pressure ulcer.



INTERVAL HISTORY:

Patient is currently afebrile. Patient is breathing comfortably.  The patient denies

having any chest pain.  No shortness of breath. No cough. No abdominal pain.  Pain to

the sacral area slightly decreased.



PHYSICAL EXAMINATION:

Blood pressure 127/69, pulse of 86, temperature 97.5.  She is 99% on 5 L nasal cannula.

General description is an elderly female lying in bed in no distress.

Respiratory system: Unlabored breathing, clear to auscultation anteriorly.

Heart S1, S2.  Regular rate and rhythm.  Abdomen soft, no tenderness.

The sacral wound is currently dressed.



LABORATORY DATA:

BUN of 17, creatinine is 2.38.



DIAGNOSTIC IMPRESSION AND PLAN:

Patient with sacral pressure ulcer with secondary cellulitis.  Culture positive for

anaerobes Gram-negative and Staph aureus.  The patient is covered with cefazolin and

Flagyl.  We will be able to continue post dialysis, Provide a PICC line.  Will discuss

with .  Continue supportive care.





MMANAHYL / MADINAN: [id number] / Job#: [id number]

## 2021-05-16 NOTE — P.PN
Subjective


Progress Note Date: 05/16/21





HISTORY OF PRESENT ILLNESS:





This is an 80-year-old  female one of my patient with a previous med

ical history significant for coronary artery disease status post ST elevation 

myocardial infarction back in 02/08/2020 post PCI and bare metal stent in the 

LAD, hypertension and hypertensive cardiovascular disease, history of paroxysmal

atrial fibrillation, history of chronic kidney disease stage III, history of 

chronic tremor, history of Esophageal cancer, history of ischemic bowel post 

partial colectomy with colostomy placement, patient was recently hospitalized at

[facility] after she was admitted to the hospital was significant

edema both lower extremities and mental status changes she was found to have an 

acute kidney injury that is oliguric on top of chronic kidney disease stage 

IIIB, patient became hyperkalemic and she did not make much urine, she ended up 

starting on hemodialysis about a month ago Monday was in Friday because she 

never recovered from her ATN, beside patient was found to have an ischemic 

cardiomyopathy with ejection fraction of 10%, patient was at her dialysis center

yesterday and few minutes into the dialysis she became unresponsive she became 

quite blue and as a matter fact she arrested a CODE BLUE was called and the 

patient was started on resuscitation at the dialysis center EMS was called and 

the patient was brought into the emergency department at [facility] and

dropped to the hospital she regained consciousness, patient was never intubated,

she had a pulse, she was seen in the emergency department and she was quite 

hypotensive, a working diagnosis of septic shock was entertained due to her 

significant unstageable decubitus ulcer in the sacral area as well as urinary 

tract infection, she was started on IV anabiotic in the form of vancomycin and 

Zosyn, she was also placed on norepinephrine drip, she was supposed to be 

admitted to the intensive care unit, she was seen in consultation by ICU and 

pulmonary as well as cardiology along with infectious disease, the decision was 

made to transfer the patient to telemetry unit after taking her off the 

norepinephrine drip, I had a long conversation with her son and the patient 

regarding her poor prognosis, we recommended to continue aggressive treatment 

for now, but no resuscitation or no code is contemplated.





5/12: Patient is laying down in bed in no apparent distress today, she denies 

any chest pain, less short of breath, she has no fever or chills, she has no 

abdominal pain, nausea or vomiting, she continues to be maintained on vancomycin

and cefepime until the final result of the cultures are back, she has been seen 

by nephrology, cardiology, pulmonary medicine, and infectious disease, the 

working diagnosis would be septic shock due to possible UTI, less likely 

infected decub ulcer, we will continue to monitor the patient very closely, her 

son was at the bedside, she did dialysis today and she tolerated it well, she 

continues to be very [place] and I discussed with her and her son wether or 

not she is in agreement to stop dialysis but she declined and wanted to continue

with dialysis as of now, we will restart Mirtazapine 7.5 mg po at bedtime as an 

appetite stimulants,we will continue with Nepro tid, and we will continue with 

current treatment plan, her son is asking for FMLA papers to be filled so he can

be with her, both patient and her son are aware of the poor prognosis. 





5/13: patient is laying down in bed she appears very weak today, she is feeling 

better according to her, she denies any chest pain, less short of breath, she 

has been tolerating her treatment very well, she wanted to continue with 

hemodialysis for now, she has no abdominal pain, her stoma is working well, she 

has pain all over her body, she does hurt in her unstageable ulcer as well as 

both feet they were put in waffled boots, we will continue current treatment 

plan and the plan is to send back to [facility]  with therapy.





5/14: Patient is laying down in bed appears to be more comfortable today, she is

hypotensive during dialysis, she was briefed about comfort measures by myself 

and by the nephrology and she wanted to continue with dialysis at this point in 

time, patient has no coughing or hemoptysis, she has no abdominal pain, she 

continues to be extremely weak, the plan is to continue current management, try 

to send her back to [facility] and try to introduce comfort measures slowly down 

the line, urine culture showed Candida glabrata, and would culture showed MSSA 

as well as anaerobes, patient is ALLERGIC to penicillin subsequent he she will 

be kept on vancomycin for now along with possible oral Flagyl.





5/15: Patient is laying down in bed she is very weak today, she is feeling 

nauseated, she did throw up twice, she is not able to eat today, I will 

discontinue morphine sulfate completely continue oxycodone, start the patient on

Zofran 4 mg IV push every 6 hours as needed, we'll monitor the patient very 

closely appears to be significantly weak, the plan is to send her back to 

[facility] hopefully on Monday.





5/16: Patient continues to be very weak and not able to eat or drink much, she 

is nauseated.  I spoke with her about taking a protein drink such as Nepro twice

every day, she will try and she was seen earlier by nephrology, and we will try 

one more time hemodialysis tomorrow morning if she is not able to tolerated we 

will have to quit dialysis and place her in hospice.





REVIEW OF SYSTEMS:





Constitutional: No documented fever, no chills, no night sweats. .  positive for

 weaknes severe weight loss,  positive for fatigue no lethargy.  No daytime 

sleepiness.


HEENT: No headache.  No blurred vision or double vision, no loss of vision.  No 

loss of Hearing, no ringing in the ears, no dizziness.  No nasal drainage or 

congestion.  No epistaxis.  No sore throat.


Lungs: positive for shortness of breath, no cough, no sputum production.  No 

wheezing.  Reports dyspnea with activity.


Cardiovascular: No chest pain, positive for  lower extremity edema.  No 

palpitations.  No paroxysmal nocturnal dyspnea.  No orthopnea.  No 

lightheadedness or dizziness.  No syncopal episodes.


Abdominal: Reports abdominal pain.  No nausea, vomiting.  No diarrhea.  No 

constipation.  No bloody or tarry stools reports loss of appetite.


Genitourinary: No dysuria, increased frequency, urgency.  No urinary retention.


Musculoskeletal: No myalgias.  positive for muscle weakness, positive for  gait 

dysfunction, no frequent falls.  positive for back pain and  neck pain.


Integumentary: posiive for sacral unstageable ulcer with significant slough.


Neurologic: No aphasia. No facial droop. No change in mentation. No head injury.

No headache. No paralysis. No paresthesia.


Psychiatric: positive for depression, anxiety .


Endocrine: significant loss of weight.  











PHYSICAL EXAMINATION:





General: This is an 80-year-old  female who is frail laying down in bed

appears to be in mild respiratory distress. 


HEENT: Head is atraumatic, normocephalic, pupils were equal round reactive to 

light and recommendation, extraocular muscle movement were intact, sclera 

nonicteric, conjunctivae were pale, mucous membranes of the mouth are somewhat 

dry.


Neck: Supple, no JVP, normal carotid upstroke bilaterally, no lymphadenopathy.


Chest: Decreased breath sounds at the bases, few rhonchi, no expiratory wheezes,

no chest wall tenderness, no intercostal retractions.


Heart: First heart sound is normal, second heart sounds normal, there is 

systolic ejection murmur 2/6 located in the left sternal border, there is 

permanent pacemaker.


Abdomen: Soft, nontender, nondistended, positive bowel sounds.  Positive for 

colostomy bag that is full with liquid stool.


Extremities: There is  +2 edema no calf tenderness DP +1 bilaterally.


Neurologic examination: Patient is awake alert and oriented X3, cranial nerves 

II-12 appear grossly intact, muscle power were 3 out of 5 in upper extremities 

and 3 out of 5 in bilateral lower extremities, deep tendon reflexes normal 

bilaterally.





ASSESSMENT AND PLAN:





1.  Cardiac arrest that required a short period of resuscitation by the dialysis

staff as well as the EMS, patient had regained consciousness immediately, the 

exact etiology is unclear, patient does have a permanent pacemaker this will be 

interrogated, patient was seen and evaluated by pulmonary, cardiology, 

nephrology, infectious disease, and it was thought that the troponin elevation 

likely related to chronic kidney disease without evidence of ischemic disease at

this time, patient is well known to have an ischemic cardiomyopathy with 

ejection fraction of 10% on a recent echocardiogram that was done less than a 

month ago, we'll continue to monitor the patient very closely.





2.  Septic shock likely related to infected decubitus unstageable sacral wound 

continue with Santyl and wet to dry dressing to the sacral area, continue IV 

antibiotic was switched to ceftezole and 2 g IV piggyback every 24 hours, along 

with metronidazole 500 mg orally 3 times every day, she was taken off cefepime 

and vancomycin, plan is to transfer the patient back to [facility] a Monday.





3.  End stage renal disease on hemodialysis Monday Wednesday and  Friday.  

Patient is not tolerating hemodialysis due to hypotension currently is not able 

to tolerate, we'll try one more time tomorrow morning and if she is not able to 

tolerated we'll consult hospice then.





4.  Hypertension and hypertensive cardiovascular disease currently hypotensive. 

Continue IV fluid Normal saline at 75 ml/h, we will continue with Proamatine 10 

mg orally tid.





5.  Coronary artery disease status post PCI of the LAD with ischemic 

cardiomyopathy.  we will continue with Metoprolol 25 mg orally bid.





6.  Hyperlipidemia.  Continue Lipitor 80 mg orally once every day.





7.  Paroxysmal atrial fibrillation.  Continue Franklyn was 2.5 mg orally twice every

day as well as amiodarone 200 mg orally once every day.





8.  Essential tremor.  Stable.





9.  Esophageal cancer status post esophagectomy stable.





10.  History of ischemic bowel status post colectomy with colostomy placement st

able.





11.  Sick sinus syndrome status post permanent pacemaker placement. post pacer 

interogation.





12.  Anemia of chronic kidney disease.  Continue Aranesp 40 mg subcutaneously 

every 7 days .





13.  Orthostatic hypotension.  Continue midodrine 10 mg orally 3 times every 

day.





14. Severe protein calorie Malnutrition. we will start Nepro 1 can p tid, we 

will start Mirtazapine 7.5 mg po qhs.





15. Depression and Anxiety . we will continue with Sertraline 50 mg orally 

daily.





16. Unstageable sacral decubitus with slough. we will continue with Santyl 

dressing, keep turning the patient every 2hours, start Nepro and Mirtazapine.





17.  DVT prophylaxis.  Currently on Eliquis 2.5 mg orally twice every day.





18.  GI prophylaxis.  Continue Protonix 40 mg orally once every day.





19.  Severe protein calorie malnutrition.  Start Nepro twice every day.





20.  Disposition [facility] on Monday possibly with hospice if she agreed





Objective





- Vital Signs


Vital signs: 


                                   Vital Signs











Temp  97.8 F   05/16/21 04:00


 


Pulse  84   05/16/21 04:00


 


Resp  16   05/16/21 04:00


 


BP  122/55   05/16/21 04:00


 


Pulse Ox  91 L  05/16/21 04:00








                                 Intake & Output











 05/15/21 05/16/21 05/16/21





 18:59 06:59 18:59


 


Intake Total 220 120 0


 


Output Total 400  


 


Balance -180 120 0


 


Weight  46 kg 


 


Intake:   


 


  Oral 220 120 0


 


Output:   


 


  Urine 50  


 


  Stool 350  


 


Other:   


 


  Voiding Method Indwelling Catheter Indwelling Catheter 














- Labs


CBC & Chem 7: 


                                 05/15/21 08:15





                                 05/15/21 08:15


Labs: 


                  Abnormal Lab Results - Last 24 Hours (Table)











  05/15/21 Range/Units





  08:15 


 


Neutrophils #  9.3 H  (1.3-7.7)  k/uL


 


Lymphocytes #  0.4 L  (1.0-4.8)  k/uL








                      Microbiology - Last 24 Hours (Table)











 05/10/21 18:46 Blood Culture - Preliminary





 Blood    No Growth after 120 hours

## 2021-05-16 NOTE — PN
PROGRESS NOTE



Patient is seen for followup for acute kidney injury, currently hemodialysis dependent

with significant hypotension, generalized debility and inability to tolerate dialysis

as outpatient.  This morning patient is lying in bed, comfortable.  She has not been

eating or drinking much.  She states that she was able to tolerate some Sprite.



PHYSICAL EXAMINATION:

On examination today, blood pressure 122/55, heart rate 84 per minute.  Examination of

the heart S1, S2.  Examination of lungs decreased breath sounds at bases.  Abdomen is

soft, nontender.  Examination of lower extremities shows edema 2+ bilaterally. CNS exam

shows patient is moving all four extremities although she is quite lethargic and she

does not move her lower legs much.



LAB:

Show sodium 144, potassium 3.7, BUN 17, creatinine 2.38.



ASSESSMENT:

1. Acute kidney injury, oliguric acute tubular necrosis, currently dialysis dependent

    and unable to tolerate dialysis as outpatient due to significant hypotension and

    generalized debility and patient being quite frail.  I have discussed with her

    regarding her not being a candidate for ongoing outpatient renal replacement

    therapy.  However, at this point the patient stated that she would like to continue

    with dialysis.  I have advised her that we will try a treatment again tomorrow and

    if she is not able to tolerate it we will not be able to continue it, particularly

    as outpatient.

2. Nausea.

3. Decreased oral intake.

4. Status post cardiac arrest.

5. Sepsis.

6. Infected decubitus ulcer.

7. Coronary artery disease with history of cardiomyopathy.

8. Severe protein calorie malnutrition.



PLAN:

Try dialysis again tomorrow, although patient is not a candidate for ongoing renal

replacement therapy particularly as outpatient.





MMODL / IJN: [id number] / Job#: [id number]

## 2021-05-17 VITALS — TEMPERATURE: 96.7 F

## 2021-05-17 RX ADMIN — Medication SCH MG: at 10:31

## 2021-05-17 RX ADMIN — ONDANSETRON PRN MG: 2 INJECTION INTRAMUSCULAR; INTRAVENOUS at 19:59

## 2021-05-17 RX ADMIN — COLLAGENASE SANTYL SCH: 250 OINTMENT TOPICAL at 15:04

## 2021-05-17 RX ADMIN — SERTRALINE HYDROCHLORIDE SCH MG: 50 TABLET, FILM COATED ORAL at 10:31

## 2021-05-17 RX ADMIN — METOPROLOL TARTRATE SCH: 25 TABLET, FILM COATED ORAL at 20:03

## 2021-05-17 RX ADMIN — PANTOPRAZOLE SODIUM SCH MG: 40 TABLET, DELAYED RELEASE ORAL at 06:00

## 2021-05-17 RX ADMIN — Medication SCH: at 18:55

## 2021-05-17 RX ADMIN — Medication SCH: at 20:03

## 2021-05-17 RX ADMIN — MIDODRINE HYDROCHLORIDE SCH MG: 5 TABLET ORAL at 08:03

## 2021-05-17 RX ADMIN — MIRTAZAPINE SCH: 15 TABLET, FILM COATED ORAL at 20:03

## 2021-05-17 RX ADMIN — METOPROLOL TARTRATE SCH MG: 25 TABLET, FILM COATED ORAL at 10:31

## 2021-05-17 RX ADMIN — APIXABAN SCH: 2.5 TABLET, FILM COATED ORAL at 18:56

## 2021-05-17 RX ADMIN — AMIODARONE HYDROCHLORIDE SCH MG: 200 TABLET ORAL at 10:31

## 2021-05-17 RX ADMIN — ATROPINE SULFATE PRN DROPS: 10 SOLUTION/ DROPS OPHTHALMIC at 21:02

## 2021-05-17 RX ADMIN — HYDROMORPHONE HYDROCHLORIDE PRN MG: 1 INJECTION, SOLUTION INTRAMUSCULAR; INTRAVENOUS; SUBCUTANEOUS at 15:28

## 2021-05-17 RX ADMIN — MIDODRINE HYDROCHLORIDE SCH: 5 TABLET ORAL at 18:55

## 2021-05-17 RX ADMIN — MIDODRINE HYDROCHLORIDE SCH: 5 TABLET ORAL at 15:03

## 2021-05-17 NOTE — PN
PROGRESS NOTE



DATE OF SERVICE:

05/17/2021



REASON FOR FOLLOWUP:

Infected sacral pressure ulcer.



INTERVAL HISTORY:

The patient is currently afebrile. Patient is breathing comfortably.  No chest pain or

cough.  No abdominal pain or any worsening pain to the sacral wound area.



PHYSICAL EXAMINATION:

Blood pressure 101/54, pulse of 94, temperature 98.2.  She is 93% on 4 L nasal cannula.

General description is an elderly female lying in bed in no distress.

RESPIRATORY SYSTEM: Unlabored breathing, clear to auscultation anteriorly.

HEART: S1, S2.  Regular rate and rhythm.

ABDOMEN:  Soft, no tenderness.



LABS:

No new labs have been obtained today.



DIAGNOSTIC IMPRESSION AND PLAN:

Patient with infected sacral pressure ulcer with local culture positive for MSSA and

anaerobes.  Will discuss with Nephrology if patient can get her antibiotics through

dialysis, we can order PICC line.  Continue with cefazolin, Flagyl and local care with

Santyl.  Continue supportive care.





MMODL / IJN: [id number] / Job#: [id number]

## 2021-05-17 NOTE — PN
PROGRESS NOTE



Patient is seen for followup for acute kidney injury currently hemodialysis dependent.

Patient has not been able to tolerate dialysis.  She had wished to continue with renal

replacement therapy post admission.  Patient was admitted with cardiac arrest at the

outpatient dialysis unit.  Her blood pressure usually runs quite low and we have not

been able to adequately dialyze her as outpatient given her frail overall condition and

hypotension.  I have discussed with her previously that we are not able to continue

with renal replacement therapy and the fact that it may actually harm her with possible

repeat episode of cardiac arrest during treatment.  The patient remains quite frail

with significant protein calorie malnutrition.  She has not been eating much.  I did

discuss this again with her and at this time she is agreeable to holding off on

dialysis.  However, she states that she is not ready for hospice yet.



PHYSICAL EXAMINATION:

On examination today, blood pressure is 101/54, heart rate 94 per minute. She is

afebrile.

EXAMINATION OF THE HEART: S1, S2.

EXAMINATION OF THE LUNGS:  Decreased breath sounds at bases.

Abdomen is soft, nontender.

Examination of lower extremities shows edema 2+ bilaterally.

CNS EXAM:  Patient is quite weak.  She is able to move all 4 extremities.



LABS:

Labs show sodium 144, potassium 3.7, chloride 114, BUN 17, creatinine 2.38.



ASSESSMENT:

1. Acute kidney injury, acute tubular necrosis, currently dialysis dependent.

    However, patient has been hypotensive and we have not been able to dialyze her

    adequately, especially as outpatient.  She did code on dialysis at the outpatient

    unit on this admission. We have not been able to remove any fluid with the dialysis

    and patient has significant edema in the lower extremities.  She does not have much

    urine output.  I have discussed with her again regarding the fact that we may

    actually harm her with dialysis given her overall poor general condition and she is

    agreeable to holding off on dialysis for now.

2. Metabolic acidosis, maintained on sodium bicarb, which I will decrease.

3. Hypokalemia, status post replacement.

4. Protein-calorie malnutrition.

5. Status post cardiac arrest.

6. Sacral pressure ulcer with cellulitis.



PLAN:

Hold off on dialysis for now.  Encourage increased oral intake.  Recommend hospice

care.





MMODL / IJN: [id number] / Job#: [id number]

## 2021-05-17 NOTE — P.PN
Subjective


Progress Note Date: 05/17/21





HISTORY OF PRESENT ILLNESS:





This is an 80-year-old  female one of my patient with a previous med

ical history significant for coronary artery disease status post ST elevation 

myocardial infarction back in 02/08/2020 post PCI and bare metal stent in the 

LAD, hypertension and hypertensive cardiovascular disease, history of paroxysmal

atrial fibrillation, history of chronic kidney disease stage III, history of 

chronic tremor, history of Esophageal cancer, history of ischemic bowel post 

partial colectomy with colostomy placement, patient was recently hospitalized at

[facility] after she was admitted to the hospital was significant

edema both lower extremities and mental status changes she was found to have an 

acute kidney injury that is oliguric on top of chronic kidney disease stage 

IIIB, patient became hyperkalemic and she did not make much urine, she ended up 

starting on hemodialysis about a month ago Monday was in Friday because she 

never recovered from her ATN, beside patient was found to have an ischemic 

cardiomyopathy with ejection fraction of 10%, patient was at her dialysis center

yesterday and few minutes into the dialysis she became unresponsive she became 

quite blue and as a matter fact she arrested a CODE BLUE was called and the 

patient was started on resuscitation at the dialysis center EMS was called and 

the patient was brought into the emergency department at [facility] and

dropped to the hospital she regained consciousness, patient was never intubated,

she had a pulse, she was seen in the emergency department and she was quite 

hypotensive, a working diagnosis of septic shock was entertained due to her 

significant unstageable decubitus ulcer in the sacral area as well as urinary 

tract infection, she was started on IV anabiotic in the form of vancomycin and 

Zosyn, she was also placed on norepinephrine drip, she was supposed to be 

admitted to the intensive care unit, she was seen in consultation by ICU and 

pulmonary as well as cardiology along with infectious disease, the decision was 

made to transfer the patient to telemetry unit after taking her off the 

norepinephrine drip, I had a long conversation with her son and the patient 

regarding her poor prognosis, we recommended to continue aggressive treatment 

for now, but no resuscitation or no code is contemplated.





5/12: Patient is laying down in bed in no apparent distress today, she denies 

any chest pain, less short of breath, she has no fever or chills, she has no 

abdominal pain, nausea or vomiting, she continues to be maintained on vancomycin

and cefepime until the final result of the cultures are back, she has been seen 

by nephrology, cardiology, pulmonary medicine, and infectious disease, the 

working diagnosis would be septic shock due to possible UTI, less likely 

infected decub ulcer, we will continue to monitor the patient very closely, her 

son was at the bedside, she did dialysis today and she tolerated it well, she 

continues to be very Cachectic and I discussed with her and her son wether or 

not she is in agreement to stop dialysis but she declined and wanted to continue

with dialysis as of now, we will restart Mirtazapine 7.5 mg po at bedtime as an 

appetite stimulants,we will continue with Nepro tid, and we will continue with 

current treatment plan, her son is asking for FMLA papers to be filled so he can

be with her, both patient and her son are aware of the poor prognosis. 





5/13: patient is laying down in bed she appears very weak today, she is feeling 

better according to her, she denies any chest pain, less short of breath, she 

has been tolerating her treatment very well, she wanted to continue with 

hemodialysis for now, she has no abdominal pain, her stoma is working well, she 

has pain all over her body, she does hurt in her unstageable ulcer as well as 

both feet they were put in waffled boots, we will continue current treatment 

plan and the plan is to send back to [facility]  with therapy.





5/14: Patient is laying down in bed appears to be more comfortable today, she is

hypotensive during dialysis, she was briefed about comfort measures by myself 

and by the nephrology and she wanted to continue with dialysis at this point in 

time, patient has no coughing or hemoptysis, she has no abdominal pain, she 

continues to be extremely weak, the plan is to continue current management, try 

to send her back to [facility] and try to introduce comfort measures slowly down 

the line, urine culture showed Candida glabrata, and would culture showed MSSA 

as well as anaerobes, patient is ALLERGIC to penicillin subsequent he she will 

be kept on vancomycin for now along with possible oral Flagyl.





5/15: Patient is laying down in bed she is very weak today, she is feeling 

nauseated, she did throw up twice, she is not able to eat today, I will 

discontinue morphine sulfate completely continue oxycodone, start the patient on

Zofran 4 mg IV push every 6 hours as needed, we'll monitor the patient very 

closely appears to be significantly weak, the plan is to send her back to 

[facility] hopefully on Monday.





5/16: Patient continues to be very weak and not able to eat or drink much, she 

is nauseated.  I spoke with her about taking a protein drink such as Nepro twice

every day, she will try and she was seen earlier by nephrology, and we will try 

one more time hemodialysis tomorrow morning if she is not able to tolerated we 

will have to quit dialysis and place her in hospice.





5/17: Pulse ox is ranging 93-98% on 4 L nasal cannula.  Blood pressure remains 

soft with this morning of 101/54, heart rate 90s to 145.  She has been afebrile.

 Urine culture is finalized with no growth as well as blood cultures no growth 

at 144 hours.  Wound culture is MSSA and gram negative anaerobes and patient is 

covered with Kefzol 2 g IV piggyback every 24 hours and Flagyl oral.  Dr. Reynoso 

is recommended continuing IV antibiotics after discharge, eliquis will be placed

on hold and PICC line will be ordered.  Patient had episode of A. fib with RVR 

yesterday afternoon and received IV metoprolol, oral Lopressor 25 mg twice daily

started.  Patient continues to have difficulty with appetite and not eating.  

She has scant amount of brown urine in her Johnson bag.  Plan is to attempt 

dialysis treatment today.  Discussed again option of hospice care and patient is

not interested.  Her daughter and/or son will be coming in later today.





REVIE W OF SYSTEMS:


Constitutional: No documented fever, no chills, no night sweats. .  positive for

 weaknes severe weight loss,  positive for fatigue no lethargy.  No daytime 

sleepiness. 


HEENT: No headache.  No blurred vision or double vision, no loss of vision.  No 

loss of Hearing, no ringing in the ears, no dizziness.  No nasal drainage or 

congestion.  No epistaxis.  No sore throat.


Lungs: positive for shortness of breath, no cough, no sputum production.  No 

wheezing.  Reports dyspnea with activity.


Cardiovascular: No chest pain, positive for  lower extremity edema.  No palpitat

ions.  No paroxysmal nocturnal dyspnea.  No orthopnea.  No lightheadedness or 

dizziness.  No syncopal episodes.


Abdominal: Reports abdominal pain.  No nausea, vomiting.  No diarrhea.  No 

constipation.  No bloody or tarry stools. reports loss of appetite.


Genitourinary: No dysuria, increased frequency, urgency.  No urinary retention. 

Chronic oliguria 


Musculoskeletal: No myalgias.  positive for muscle weakness, positive for  gait 

dysfunction, no frequent falls.  positive for back pain and  neck pain.


Integumentary: posiive for sacral unstageable ulcer with significant slough. 

Reports significant pain at site.


Neurologic: No aphasia. No facial droop. No change in mentation. No head injury.

No headache. No paralysis. No paresthesia.


Psychiatric: positive for depression, anxiety .


Endocrine: significant loss of weight.  





PHYSICAL EXAMINATION:


General: This is an 80-year-old  female who is frail laying down in bed

appears to be in no respiratory distress. 


HEENT: Head is atraumatic, normocephalic, pupils were equal round reactive to 

light and recommendation, extraocular muscle movement were intact, sclera 

nonicteric, conjunctivae were pale, mucous membranes of the mouth are somewhat 

dry.


Neck: Supple, no JVP, normal carotid upstroke bilaterally, no lymphadenopathy.


Chest: Decreased breath sounds at the bases, few rhonchi, no expiratory wheezes,

no chest wall tenderness, no intercostal retractions.


Heart: First heart sound is normal, second heart sounds normal, there is 

systolic ejection murmur 2/6 located in the left sternal border, there is 

permanent pacemaker.


Abdomen: Soft, nontender, nondistended, positive bowel sounds.  Positive for 

colostomy bag that is full with liquid stool.


Extremities: There is  +2 edema no calf tenderness DP +1 bilaterally.


Neurologic examination: Patient is awake alert and oriented X3, cranial nerves 

II-12 appear grossly intact, muscle power were 3 out of 5 in upper extremities 

and 3 out of 5 in bilateral lower extremities, deep tendon reflexes normal 

bilaterally.





ASSESSMENT AND PLAN:


1.  Cardiac arrest that required a short period of resuscitation by the dialysis

staff as well as the EMS, patient had regained consciousness immediately, the 

exact etiology is unclear, patient does have a permanent pacemaker this will be 

interrogated, patient was seen and evaluated by pulmonary, cardiology, 

nephrology, infectious disease, and it was thought that the troponin elevation 

likely related to chronic kidney disease without evidence of ischemic disease at

this time, patient is well known to have an ischemic cardiomyopathy with e

jection fraction of 10% on a recent echocardiogram that was done less than a 

month ago, we'll continue to monitor the patient very closely.





2.  Septic shock likely related to infected decubitus unstageable sacral wound 

continue with Santyl and wet to dry dressing to the sacral area, continue IV ant

ibiotic was switched to ceftezole and 2 g IV piggyback every 24 hours, along 

with metronidazole 500 mg orally 3 times every, eliquis will be placed on hold 

and PICC line ordered.  





3.  End stage renal disease on hemodialysis Monday Wednesday and  Friday.  

Patient is not tolerating hemodialysis due to hypotension currently is not able 

to tolerate, we'll try one more time today.





4.  Hypertension and hypertensive cardiovascular disease currently hypotensive. 

Continue with Proamatine 10 mg orally tid.





5.  Coronary artery disease status post PCI of the LAD with ischemic 

cardiomyopathy.  we will continue with Metoprolol 25 mg orally bid.





6.  Hyperlipidemia.  Continue Lipitor 80 mg orally once every day.





7.  Paroxysmal atrial fibrillation. Episode of atrial fibrillation w RVR status 

post IV metoprolol, start Lopressor 25 mg oral twice daily, continue amiodarone 

200 mg once daily, hold eliquis for PICC line insertion.  





8.  Essential tremor.  Stable.





9.  Esophageal cancer status post esophagectomy stable.





10.  History of ischemic bowel status post colectomy with colostomy placement 

stable.





11.  Sick sinus syndrome status post permanent pacemaker placement. post pacer 

interogation.





12.  Anemia of chronic kidney disease.  Continue Aranesp 40 mg subcutaneously 

every 7 days .





13.  Orthostatic hypotension.  Continue midodrine 10 mg orally 3 times every 

day.





14. Severe protein calorie Malnutrition. we will start Nepro 1 can p tid, we 

will start Mirtazapine 7.5 mg po qhs.





15. Depression and Anxiety . we will continue with Sertraline 50 mg orally 

daily.





16. Unstageable sacral decubitus with slough. we will continue with Santyl 

dressing, keep turning the patient every 2hours, start Nepro and Mirtazapine.





17.  DVT prophylaxis.  Currently on Eliquis 2.5 mg orally twice every day.





18.  GI prophylaxis.  Continue Protonix 40 mg orally once every day.





19.  Severe protein calorie malnutrition.  Continue Nepro twice every day.





20.  Disposition [place].  Patient is not interested in hospice care at this 

time.





Impression and plan of care have been directed as dictated by the signing 

physician.  Leena Barragan nurse practitioner acting as scribe for signing 

physician.








Objective





- Vital Signs


Vital signs: 


                                   Vital Signs











Temp  98.2 F   05/17/21 03:28


 


Pulse  94   05/17/21 03:28


 


Resp  16   05/17/21 03:28


 


BP  101/54   05/17/21 03:28


 


Pulse Ox  93 L  05/17/21 03:28








                                 Intake & Output











 05/16/21 05/17/21 05/17/21





 18:59 06:59 18:59


 


Intake Total 118 240 


 


Output Total 175 15 


 


Balance -57 225 


 


Weight  37.5 kg 


 


Intake:   


 


  Oral 118 240 


 


Output:   


 


  Urine 25 15 


 


  Stool 150  


 


Other:   


 


  Voiding Method Indwelling Catheter Indwelling Catheter 


 


  # Bowel Movements 25  














- Labs


CBC & Chem 7: 


                                 05/15/21 08:15





                                 05/16/21 10:18


Labs: 


                  Abnormal Lab Results - Last 24 Hours (Table)











  05/16/21 Range/Units





  10:18 


 


Chloride  114 H  ()  mmol/L


 


Creatinine  2.38 H  (0.52-1.04)  mg/dL


 


Calcium  8.2 L  (8.4-10.2)  mg/dL








                      Microbiology - Last 24 Hours (Table)











 05/10/21 18:46 Blood Culture - Final





 Blood    No Growth after 144 hours

## 2021-05-18 VITALS — HEART RATE: 70 BPM

## 2021-05-18 VITALS — SYSTOLIC BLOOD PRESSURE: 62 MMHG | DIASTOLIC BLOOD PRESSURE: 31 MMHG

## 2021-05-18 VITALS — RESPIRATION RATE: 10 BRPM

## 2021-05-18 RX ADMIN — HYDROMORPHONE HYDROCHLORIDE PRN MG: 1 INJECTION, SOLUTION INTRAMUSCULAR; INTRAVENOUS; SUBCUTANEOUS at 03:53

## 2021-05-18 RX ADMIN — ATROPINE SULFATE PRN DROPS: 10 SOLUTION/ DROPS OPHTHALMIC at 03:53

## 2021-05-18 RX ADMIN — PANTOPRAZOLE SODIUM SCH: 40 TABLET, DELAYED RELEASE ORAL at 05:03

## 2021-05-18 RX ADMIN — MIDODRINE HYDROCHLORIDE SCH: 5 TABLET ORAL at 08:40

## 2021-05-18 RX ADMIN — HYDROMORPHONE HYDROCHLORIDE PRN MG: 1 INJECTION, SOLUTION INTRAMUSCULAR; INTRAVENOUS; SUBCUTANEOUS at 00:57

## 2021-05-18 NOTE — P.DS
Providers


Date of admission: 


05/10/21 19:53





Expected date of discharge: 21


Attending physician: 


Myrna Bill





Consults: 





                                        





05/10/21 19:40


Consult Physician Routine 


   Consulting Provider: Adebayo Grimaldo


   Consult Reason/Comments: esrd


   Do you want consulting provider notified?: Yes





05/10/21 19:45


Consult Physician Routine 


   Consulting Provider: Terri Mccallum


   Consult Reason/Comments: cardiac arrest?


   Do you want consulting provider notified?: Yes





05/10/21 19:48


Consult Physician Routine 


   Consulting Provider: Yasemin Reynoso


   Consult Reason/Comments: decub ulcer, cellulitis


   Do you want consulting provider notified?: Yes





05/10/21 19:50


Consult Physician Stat 


   Consulting Provider: Jairo Doss


   Consult Reason/Comments: icu patient


   Do you want consulting provider notified?: Already Contacted











Primary care physician: 


Myrna Bill





Hospital Course: 





HISTORY OF PRESENT ILLNESS:





This is an 80-year-old  female one of my patient with a previous 

medical history significant for coronary artery disease status post ST elevation

myocardial infarction back in 2020 post PCI and bare metal stent in the 

LAD, hypertension and hypertensive cardiovascular disease, history of paroxysmal

atrial fibrillation, history of chronic kidney disease stage III, history of 

chronic tremor, history of Esophageal cancer, history of ischemic bowel post par

tial colectomy with colostomy placement, patient was recently hospitalized at 

[facility] after she was admitted to the hospital was significant

edema both lower extremities and mental status changes she was found to have an 

acute kidney injury that is oliguric on top of chronic kidney disease stage 

IIIB, patient became hyperkalemic and she did not make much urine, she ended up 

starting on hemodialysis about a month ago Monday was in Friday because she 

never recovered from her ATN, beside patient was found to have an ischemic 

cardiomyopathy with ejection fraction of 10%, patient was at her dialysis center

yesterday and few minutes into the dialysis she became unresponsive she became 

quite blue and as a matter fact she arrested a CODE BLUE was called and the 

patient was started on resuscitation at the dialysis center EMS was called and 

the patient was brought into the emergency department at [facility] and

dropped to the hospital she regained consciousness, patient was never intubated,

she had a pulse, she was seen in the emergency department and she was quite 

hypotensive, a working diagnosis of septic shock was entertained due to her 

significant unstageable decubitus ulcer in the sacral area as well as urinary 

tract infection, she was started on IV anabiotic in the form of vancomycin and 

Zosyn, she was also placed on norepinephrine drip, she was supposed to be 

admitted to the intensive care unit, she was seen in consultation by ICU and 

pulmonary as well as cardiology along with infectious disease, the decision was 

made to transfer the patient to telemetry unit after taking her off the 

norepinephrine drip, I had a long conversation with her son and the patient 

regarding her poor prognosis, we recommended to continue aggressive treatment 

for now, but no resuscitation or no code is contemplated.





: Patient is laying down in bed in no apparent distress today, she denies 

any chest pain, less short of breath, she has no fever or chills, she has no 

abdominal pain, nausea or vomiting, she continues to be maintained on vancomycin

and cefepime until the final result of the cultures are back, she has been seen 

by nephrology, cardiology, pulmonary medicine, and infectious disease, the 

working diagnosis would be septic shock due to possible UTI, less likely 

infected decub ulcer, we will continue to monitor the patient very closely, her 

son was at the bedside, she did dialysis today and she tolerated it well, she 

continues to be very Cachectic and I discussed with her and her son wether or 

not she is in agreement to stop dialysis but she declined and wanted to continue

with dialysis as of now, we will restart Mirtazapine 7.5 mg po at bedtime as an 

appetite stimulants,we will continue with Nepro tid, and we will continue with 

current treatment plan, her son is asking for [employer] papers to be filled so he can

be with her, both patient and her son are aware of the poor prognosis. 





: patient is laying down in bed she appears very weak today, she is feeling 

better according to her, she denies any chest pain, less short of breath, she 

has been tolerating her treatment very well, she wanted to continue with 

hemodialysis for now, she has no abdominal pain, her stoma is working well, she 

has pain all over her body, she does hurt in her unstageable ulcer as well as 

both feet they were put in waffled boots, we will continue current treatment 

plan and the plan is to send back to [facility]  with therapy.





: Patient is laying down in bed appears to be more comfortable today, she is

hypotensive during dialysis, she was briefed about comfort measures by myself 

and by the nephrology and she wanted to continue with dialysis at this point in 

time, patient has no coughing or hemoptysis, she has no abdominal pain, she 

continues to be extremely weak, the plan is to continue current management, try 

to send her back to [facility] and try to introduce comfort measures slowly down 

the line, urine culture showed Candida glabrata, and would culture showed MSSA 

as well as anaerobes, patient is ALLERGIC to penicillin subsequent he she will 

be kept on vancomycin for now along with possible oral Flagyl.





5/15: Patient is laying down in bed she is very weak today, she is feeling 

nauseated, she did throw up twice, she is not able to eat today, I will 

discontinue morphine sulfate completely continue oxycodone, start the patient on

Zofran 4 mg IV push every 6 hours as needed, we'll monitor the patient very 

closely appears to be significantly weak, the plan is to send her back to 

[facility] hopefully on Monday.





: Patient continues to be very weak and not able to eat or drink much, she 

is nauseated.  I spoke with her about taking a protein drink such as Nepro twice

every day, she will try and she was seen earlier by nephrology, and we will try 

one more time hemodialysis tomorrow morning if she is not able to tolerated we 

will have to quit dialysis and place her in hospice.





: Pulse ox is ranging 93-98% on 4 L nasal cannula.  Blood pressure remains 

soft with this morning of 101/54, heart rate 90s to 145.  She has been afebrile.

 Urine culture is finalized with no growth as well as blood cultures no growth 

at 144 hours.  Wound culture is MSSA and gram negative anaerobes and patient is 

covered with Kefzol 2 g IV piggyback every 24 hours and Flagyl oral.  Dr. Reynoso 

is recommended continuing IV antibiotics after discharge, eliquis will be placed

on hold and PICC line will be ordered.  Patient had episode of A. fib with RVR 

yesterday afternoon and received IV metoprolol, oral Lopressor 25 mg twice daily

started.  Patient continues to have difficulty with appetite and not eating.  

She has scant amount of brown urine in her Johnson bag.  Plan is to attempt 

dialysis treatment today.  Discussed again option of hospice care and patient is

not interested.  Her daughter and/or son will be coming in later today.





: Patient  on the morning of May 18.  Please see nursing Dr. lara

for details.











DISCHARGE DIAGNOSES:


1.  Cardiac arrest that required a short period of resuscitation by the dialysis

staff as well as the EMS, patient had regained consciousness immediately, the 

exact etiology is unclear.


2.  Septic shock likely related to infected decubitus unstageable sacral wound. 


3.  End stage renal disease on hemodialysis  and Friday.  


4.  Hypertension and hypertensive cardiovascular disease currently hypotensive. 




5.  Coronary artery disease status post PCI of the LAD with ischemic 

cardiomyopathy.  


6.  Hyperlipidemia.  


7.  Paroxysmal atrial fibrillation.  


8.  Essential tremor.  


9.  Esophageal cancer status post esophagectomy stable.


10.  History of ischemic bowel status post colectomy with colostomy placement 

stable.


11.  Sick sinus syndrome status post permanent pacemaker placement. post pacer 

interrogation.


12.  Anemia of chronic kidney disease.  


13.  Orthostatic hypotension.  


14.  Severe protein calorie Malnutrition. 


15.  Depression and Anxiety. 


16.  Unstageable sacral decubitus with slough. 


17.  Severe protein calorie malnutrition.  Start Nepro twice every day.





Impression and plan of care have been directed as dictated by the signing 

physician.  Leena Barragan nurse practitioner acting as scribe for signing 

physician.











Patient Condition at Discharge: Stable





Plan - Discharge Summary


Discharge Rx Participant: No


New Discharge Prescriptions: 


No Action


   Apixaban [Eliquis] 2.5 mg PO BID@0800,1700


   oxyCODONE HCL 5 mg PO TID PRN


     PRN Reason: Pain


   Metoclopramide HCl [Reglan] 5 mg PO TID PRN


     PRN Reason: Nausea And Vomiting


   Acetaminophen Tab [Tylenol] 1,000 mg PO TID PRN


     PRN Reason: Pain


   Midodrine HCl [ProAmatine] 10 mg PO TID@0800,1200,1700


   Sertraline [Zoloft] 25 mg PO DAILY@0800


   Mirtazapine 7.5 mg PO HS@2100


   Pantoprazole [Protonix] 40 mg PO DAILY@0600


   ALPRAZolam [Xanax] 0.25 mg PO BID PRN


     PRN Reason: Anxiety


   bisacodyL [Dulcolax] 10 mg RECTAL DAILY PRN


     PRN Reason: Constipation


   Nepro 1 can PO TID@0800,1200,2100


   Ipratropium-Albuterol Nebulize [Duoneb 0.5 mg-3 mg/3 ml Soln] 3 ml PO 

QID@08,12,17,


   Metoprolol Tartrate [Lopressor] 25 mg PO BID@0800,1700


   Liquacel (Unknown Strength) 30 ml PO BID@0800,1200


   Melatonin 1 mg PO HS@2100


   Collagenase [Santyl] 1 applic TOPICAL DAILY


   Atorvastatin [Lipitor] 80 mg PO HS@2100


   Amiodarone HCl [Pacerone] 200 mg PO DAILY@0800


Discharge Medication List





Apixaban [Eliquis] 2.5 mg PO BID@0800,1700 20 [History]


ALPRAZolam [Xanax] 0.25 mg PO BID PRN 05/10/21 [History]


Acetaminophen Tab [Tylenol] 1,000 mg PO TID PRN 05/10/21 [History]


Amiodarone HCl [Pacerone] 200 mg PO DAILY@0800 05/10/21 [History]


Atorvastatin [Lipitor] 80 mg PO HS@2100 05/10/21 [History]


Collagenase [Santyl] 1 applic TOPICAL DAILY 05/10/21 [History]


Ipratropium-Albuterol Nebulize [Duoneb 0.5 mg-3 mg/3 ml Soln] 3 ml PO 

QID@08,12,17,21 05/10/21 [History]


Liquacel (Unknown Strength) 30 ml PO BID@0800,1200 05/10/21 [History]


Melatonin 1 mg PO HS@2100 05/10/21 [History]


Metoclopramide HCl [Reglan] 5 mg PO TID PRN 05/10/21 [History]


Metoprolol Tartrate [Lopressor] 25 mg PO BID@0800,1700 05/10/21 [History]


Midodrine HCl [ProAmatine] 10 mg PO TID@0800,1200,1700 05/10/21 [History]


Mirtazapine 7.5 mg PO HS@2100 05/10/21 [History]


Nepro 1 can PO TID@0800,1200,2100 05/10/21 [History]


Pantoprazole [Protonix] 40 mg PO DAILY@0600 05/10/21 [History]


Sertraline [Zoloft] 25 mg PO DAILY@0800 05/10/21 [History]


bisacodyL [Dulcolax] 10 mg RECTAL DAILY PRN 05/10/21 [History]


oxyCODONE HCL 5 mg PO TID PRN 05/10/21 [History]








Follow up Appointment(s)/Referral(s): 


Myrna Bill MD [Primary Care Provider] - 1-2 days


Discharge Disposition: 





- Preliminary Cause of Death


Preliminary Cause of Death: Septic shock likely related to infected decubitus 

unstageable sacral wound